# Patient Record
Sex: FEMALE | Race: WHITE | Employment: FULL TIME | URBAN - METROPOLITAN AREA
[De-identification: names, ages, dates, MRNs, and addresses within clinical notes are randomized per-mention and may not be internally consistent; named-entity substitution may affect disease eponyms.]

---

## 2020-12-04 PROBLEM — N97.0 ANOVULATORY BLEEDING: Status: ACTIVE | Noted: 2020-12-04

## 2020-12-04 PROBLEM — N93.9 ABNORMAL UTERINE BLEEDING (AUB): Status: ACTIVE | Noted: 2020-12-04

## 2023-05-30 ENCOUNTER — OFFICE VISIT (OUTPATIENT)
Dept: OBGYN CLINIC | Age: 28
End: 2023-05-30
Payer: COMMERCIAL

## 2023-05-30 ENCOUNTER — HOSPITAL ENCOUNTER (OUTPATIENT)
Age: 28
Discharge: HOME | End: 2023-05-30
Payer: COMMERCIAL

## 2023-05-30 VITALS — BODY MASS INDEX: 26.31 KG/M2 | WEIGHT: 163 LBS | DIASTOLIC BLOOD PRESSURE: 72 MMHG | SYSTOLIC BLOOD PRESSURE: 128 MMHG

## 2023-05-30 DIAGNOSIS — O03.9 SAB (SPONTANEOUS ABORTION): ICD-10-CM

## 2023-05-30 DIAGNOSIS — R11.0 NAUSEA: ICD-10-CM

## 2023-05-30 DIAGNOSIS — O03.9 SAB (SPONTANEOUS ABORTION): Primary | ICD-10-CM

## 2023-05-30 DIAGNOSIS — R10.2 PELVIC CRAMPING: ICD-10-CM

## 2023-05-30 DIAGNOSIS — Z67.91 BLOOD TYPE, RH NEGATIVE: ICD-10-CM

## 2023-05-30 PROBLEM — Z3A.39 39 WEEKS GESTATION OF PREGNANCY: Status: RESOLVED | Noted: 2021-12-20 | Resolved: 2023-05-30

## 2023-05-30 LAB
ABO + RH BLD: NORMAL
BLOOD GROUP ANTIBODIES SERPL: NEGATIVE

## 2023-05-30 PROCEDURE — 86901 BLOOD TYPING SEROLOGIC RH(D): CPT

## 2023-05-30 PROCEDURE — 86850 RBC ANTIBODY SCREEN: CPT

## 2023-05-30 PROCEDURE — 99214 OFFICE O/P EST MOD 30 MIN: CPT | Performed by: NURSE PRACTITIONER

## 2023-05-30 PROCEDURE — 84702 CHORIONIC GONADOTROPIN TEST: CPT

## 2023-05-30 PROCEDURE — 36415 COLL VENOUS BLD VENIPUNCTURE: CPT

## 2023-05-30 PROCEDURE — 86900 BLOOD TYPING SEROLOGIC ABO: CPT

## 2023-05-31 LAB — HCG QUANTITATIVE: ABNORMAL MIU/ML

## 2023-06-01 ENCOUNTER — NURSE ONLY (OUTPATIENT)
Dept: OBGYN CLINIC | Age: 28
End: 2023-06-01
Payer: COMMERCIAL

## 2023-06-01 ENCOUNTER — HOSPITAL ENCOUNTER (OUTPATIENT)
Age: 28
Discharge: HOME | End: 2023-06-01
Payer: COMMERCIAL

## 2023-06-01 VITALS — WEIGHT: 162.6 LBS | BODY MASS INDEX: 26.24 KG/M2 | DIASTOLIC BLOOD PRESSURE: 72 MMHG | SYSTOLIC BLOOD PRESSURE: 112 MMHG

## 2023-06-01 DIAGNOSIS — O03.9 SAB (SPONTANEOUS ABORTION): Primary | ICD-10-CM

## 2023-06-01 DIAGNOSIS — Z67.91 BLOOD TYPE, RH NEGATIVE: ICD-10-CM

## 2023-06-01 DIAGNOSIS — O03.9 SAB (SPONTANEOUS ABORTION): ICD-10-CM

## 2023-06-01 LAB — HCG QUANTITATIVE: ABNORMAL MIU/ML

## 2023-06-01 PROCEDURE — 96372 THER/PROPH/DIAG INJ SC/IM: CPT | Performed by: OBSTETRICS & GYNECOLOGY

## 2023-06-01 PROCEDURE — 36415 COLL VENOUS BLD VENIPUNCTURE: CPT

## 2023-06-01 PROCEDURE — 84702 CHORIONIC GONADOTROPIN TEST: CPT

## 2023-06-02 ENCOUNTER — TELEPHONE (OUTPATIENT)
Dept: OBGYN CLINIC | Age: 28
End: 2023-06-02

## 2023-06-02 ENCOUNTER — PREP FOR PROCEDURE (OUTPATIENT)
Dept: OBGYN CLINIC | Age: 28
End: 2023-06-02

## 2023-06-02 ENCOUNTER — HOSPITAL ENCOUNTER (OUTPATIENT)
Dept: PREADMISSION TESTING | Age: 28
End: 2023-06-02
Payer: COMMERCIAL

## 2023-06-02 VITALS
BODY MASS INDEX: 26.03 KG/M2 | WEIGHT: 162 LBS | DIASTOLIC BLOOD PRESSURE: 87 MMHG | HEART RATE: 106 BPM | SYSTOLIC BLOOD PRESSURE: 140 MMHG | OXYGEN SATURATION: 100 % | TEMPERATURE: 98.4 F | RESPIRATION RATE: 16 BRPM | HEIGHT: 66 IN

## 2023-06-02 DIAGNOSIS — O03.9 SAB (SPONTANEOUS ABORTION): Primary | ICD-10-CM

## 2023-06-02 LAB
ALBUMIN SERPL-MCNC: 4.4 G/DL (ref 3.5–5.2)
ALP SERPL-CCNC: 55 U/L (ref 35–104)
ALT SERPL-CCNC: 28 U/L (ref 5–33)
ANION GAP SERPL CALCULATED.3IONS-SCNC: 10 MMOL/L (ref 9–17)
AST SERPL-CCNC: 22 U/L
BASOPHILS # BLD: 0.1 K/UL (ref 0–0.2)
BASOPHILS NFR BLD: 1 % (ref 0–2)
BILIRUB SERPL-MCNC: 0.5 MG/DL (ref 0.3–1.2)
BILIRUB UR QL STRIP: NEGATIVE
BUN SERPL-MCNC: 10 MG/DL (ref 6–20)
CALCIUM SERPL-MCNC: 9.4 MG/DL (ref 8.6–10.4)
CHLORIDE SERPL-SCNC: 102 MMOL/L (ref 98–107)
CLARITY UR: CLEAR
CO2 SERPL-SCNC: 24 MMOL/L (ref 20–31)
COLOR UR: YELLOW
COMMENT UA: NORMAL
CREAT SERPL-MCNC: 0.5 MG/DL (ref 0.5–0.9)
EOSINOPHIL # BLD: 0.2 K/UL (ref 0–0.4)
EOSINOPHILS RELATIVE PERCENT: 2 % (ref 0–4)
ERYTHROCYTE [DISTWIDTH] IN BLOOD BY AUTOMATED COUNT: 13.7 % (ref 11.5–14.9)
GFR SERPL CREATININE-BSD FRML MDRD: >60 ML/MIN/1.73M2
GLUCOSE SERPL-MCNC: 92 MG/DL (ref 70–99)
GLUCOSE UR STRIP-MCNC: NEGATIVE MG/DL
HCT VFR BLD AUTO: 39.8 % (ref 36–46)
HGB BLD-MCNC: 13.5 G/DL (ref 12–16)
HGB UR QL STRIP.AUTO: NEGATIVE
INR PPP: 1.1
KETONES UR STRIP-MCNC: NEGATIVE MG/DL
LEUKOCYTE ESTERASE UR QL STRIP: NEGATIVE
LYMPHOCYTES # BLD: 27 % (ref 24–44)
LYMPHOCYTES NFR BLD: 2.5 K/UL (ref 1–4.8)
MCH RBC QN AUTO: 28.7 PG (ref 26–34)
MCHC RBC AUTO-ENTMCNC: 33.8 G/DL (ref 31–37)
MCV RBC AUTO: 84.8 FL (ref 80–100)
MONOCYTES NFR BLD: 0.6 K/UL (ref 0.1–1.3)
MONOCYTES NFR BLD: 6 % (ref 1–7)
NEUTROPHILS NFR BLD: 64 % (ref 36–66)
NEUTS SEG NFR BLD: 5.9 K/UL (ref 1.3–9.1)
NITRITE UR QL STRIP: NEGATIVE
PARTIAL THROMBOPLASTIN TIME: 31.2 SEC (ref 24–36)
PH UR STRIP: 6.5 [PH] (ref 5–8)
PLATELET # BLD AUTO: 292 K/UL (ref 150–450)
PMV BLD AUTO: 8.2 FL (ref 6–12)
POTASSIUM SERPL-SCNC: 3.8 MMOL/L (ref 3.7–5.3)
PROT SERPL-MCNC: 7.4 G/DL (ref 6.4–8.3)
PROT UR STRIP-MCNC: NEGATIVE MG/DL
PROTHROMBIN TIME: 14 SEC (ref 11.8–14.6)
RBC # BLD AUTO: 4.7 M/UL (ref 4–5.2)
SODIUM SERPL-SCNC: 136 MMOL/L (ref 135–144)
SP GR UR STRIP: 1.02 (ref 1–1.03)
UROBILINOGEN UR STRIP-ACNC: NORMAL
WBC OTHER # BLD: 9.3 K/UL (ref 3.5–11)

## 2023-06-02 PROCEDURE — 85730 THROMBOPLASTIN TIME PARTIAL: CPT

## 2023-06-02 PROCEDURE — 87086 URINE CULTURE/COLONY COUNT: CPT

## 2023-06-02 PROCEDURE — 80053 COMPREHEN METABOLIC PANEL: CPT

## 2023-06-02 PROCEDURE — 85610 PROTHROMBIN TIME: CPT

## 2023-06-02 PROCEDURE — 36415 COLL VENOUS BLD VENIPUNCTURE: CPT

## 2023-06-02 PROCEDURE — 85027 COMPLETE CBC AUTOMATED: CPT

## 2023-06-02 PROCEDURE — 81003 URINALYSIS AUTO W/O SCOPE: CPT

## 2023-06-02 RX ORDER — MISOPROSTOL 200 UG/1
800 TABLET ORAL ONCE
Qty: 4 TABLET | Refills: 1 | Status: SHIPPED | OUTPATIENT
Start: 2023-06-02 | End: 2023-06-02

## 2023-06-02 NOTE — DISCHARGE INSTRUCTIONS
Pre-op Instructions For Out-Patient Surgery    Medication Instructions:  Please stop herbs and any supplements now (includes vitamins and minerals). Please contact your surgeon and prescribing physician for pre-op instructions for any blood thinners. If you have inhalers/aerosol treatments at home, please use them the morning of your surgery and bring the inhalers with you to the hospital.    Please take the following medications the morning of your surgery with a sip of water:    None     Surgery Instructions:  After midnight before surgery:  Do not eat or drink anything, including water, mints, gum, and hard candy. You may brush your teeth without swallowing. No smoking, chewing tobacco, or street drugs. Please shower or bathe before surgery. If you were given Surgical Scrub Chlorhexidine Gluconate Liquid (CHG), please shower the night before and the morning of your surgery following the detailed instructions you received during your pre-admission visit. Please do not wear any cologne, lotion, powder, deodorant, jewelry, piercings, perfume, makeup, nail polish, hair accessories, or hair spray on the day of surgery. Wear loose comfortable clothing. Leave your valuables at home. Bring a storage case for any glasses/contacts. An adult who is responsible for you MUST drive you home and should be with you for the first 24 hours after surgery. If having out-patient knee and foot surgeries, please arrange for planned crutches, walker, or wheelchair before arriving to the hospital.    The Day of Surgery:  Arrive at [facility] AT [facility]tr[facility] at the time directed by your surgeon and check in at the desk. If you have a living will or healthcare power of , please bring a copy. You will be taken to the pre-op holding area where you will be prepared for surgery.   A physical assessment will be performed by a nurse practitioner or house

## 2023-06-03 LAB
MICROORGANISM SPEC CULT: NORMAL
SPECIMEN DESCRIPTION: NORMAL

## 2023-06-05 ENCOUNTER — HOSPITAL ENCOUNTER (OUTPATIENT)
Age: 28
Discharge: HOME | End: 2023-06-05
Payer: COMMERCIAL

## 2023-06-05 DIAGNOSIS — O03.9 SAB (SPONTANEOUS ABORTION): ICD-10-CM

## 2023-06-05 LAB — HCG QUANTITATIVE: ABNORMAL MIU/ML

## 2023-06-05 PROCEDURE — 84702 CHORIONIC GONADOTROPIN TEST: CPT

## 2023-06-05 PROCEDURE — 36415 COLL VENOUS BLD VENIPUNCTURE: CPT

## 2023-06-06 NOTE — TELEPHONE ENCOUNTER
Spoke to pt 6/2/23 HCG levels reviewed she wanted to be scheduled suction D &C and she was going to try cytotec though over weekend

## 2023-06-07 ENCOUNTER — ANESTHESIA EVENT (OUTPATIENT)
Dept: OPERATING ROOM | Age: 28
End: 2023-06-07
Payer: COMMERCIAL

## 2023-06-08 ENCOUNTER — ANESTHESIA (OUTPATIENT)
Dept: OPERATING ROOM | Age: 28
End: 2023-06-08
Payer: COMMERCIAL

## 2023-06-08 ENCOUNTER — HOSPITAL ENCOUNTER (OUTPATIENT)
Age: 28
Setting detail: OUTPATIENT SURGERY
Discharge: HOME | End: 2023-06-08
Attending: OBSTETRICS & GYNECOLOGY | Admitting: OBSTETRICS & GYNECOLOGY
Payer: COMMERCIAL

## 2023-06-08 VITALS
RESPIRATION RATE: 12 BRPM | WEIGHT: 162 LBS | BODY MASS INDEX: 26.03 KG/M2 | OXYGEN SATURATION: 96 % | TEMPERATURE: 97.3 F | DIASTOLIC BLOOD PRESSURE: 67 MMHG | SYSTOLIC BLOOD PRESSURE: 117 MMHG | HEART RATE: 85 BPM | HEIGHT: 66 IN

## 2023-06-08 DIAGNOSIS — O02.1 MISSED ABORTION: ICD-10-CM

## 2023-06-08 PROCEDURE — 3700000001 HC ADD 15 MINUTES (ANESTHESIA): Performed by: OBSTETRICS & GYNECOLOGY

## 2023-06-08 PROCEDURE — 2500000003 HC RX 250 WO HCPCS: Performed by: NURSE ANESTHETIST, CERTIFIED REGISTERED

## 2023-06-08 PROCEDURE — 88305 TISSUE EXAM BY PATHOLOGIST: CPT

## 2023-06-08 PROCEDURE — 3600000012 HC SURGERY LEVEL 2 ADDTL 15MIN: Performed by: OBSTETRICS & GYNECOLOGY

## 2023-06-08 PROCEDURE — 7100000001 HC PACU RECOVERY - ADDTL 15 MIN: Performed by: OBSTETRICS & GYNECOLOGY

## 2023-06-08 PROCEDURE — 2580000003 HC RX 258: Performed by: ANESTHESIOLOGY

## 2023-06-08 PROCEDURE — 6360000002 HC RX W HCPCS: Performed by: NURSE ANESTHETIST, CERTIFIED REGISTERED

## 2023-06-08 PROCEDURE — 7100000010 HC PHASE II RECOVERY - FIRST 15 MIN: Performed by: OBSTETRICS & GYNECOLOGY

## 2023-06-08 PROCEDURE — 3600000002 HC SURGERY LEVEL 2 BASE: Performed by: OBSTETRICS & GYNECOLOGY

## 2023-06-08 PROCEDURE — 6370000000 HC RX 637 (ALT 250 FOR IP): Performed by: ANESTHESIOLOGY

## 2023-06-08 PROCEDURE — 7100000031 HC ASPR PHASE II RECOVERY - ADDTL 15 MIN: Performed by: OBSTETRICS & GYNECOLOGY

## 2023-06-08 PROCEDURE — 7100000030 HC ASPR PHASE II RECOVERY - FIRST 15 MIN: Performed by: OBSTETRICS & GYNECOLOGY

## 2023-06-08 PROCEDURE — 6360000002 HC RX W HCPCS: Performed by: ANESTHESIOLOGY

## 2023-06-08 PROCEDURE — 3700000000 HC ANESTHESIA ATTENDED CARE: Performed by: OBSTETRICS & GYNECOLOGY

## 2023-06-08 PROCEDURE — 2709999900 HC NON-CHARGEABLE SUPPLY: Performed by: OBSTETRICS & GYNECOLOGY

## 2023-06-08 PROCEDURE — 7100000000 HC PACU RECOVERY - FIRST 15 MIN: Performed by: OBSTETRICS & GYNECOLOGY

## 2023-06-08 PROCEDURE — 6370000000 HC RX 637 (ALT 250 FOR IP): Performed by: STUDENT IN AN ORGANIZED HEALTH CARE EDUCATION/TRAINING PROGRAM

## 2023-06-08 PROCEDURE — 7100000011 HC PHASE II RECOVERY - ADDTL 15 MIN: Performed by: OBSTETRICS & GYNECOLOGY

## 2023-06-08 RX ORDER — HYDRALAZINE HYDROCHLORIDE 20 MG/ML
10 INJECTION INTRAMUSCULAR; INTRAVENOUS
Status: DISCONTINUED | OUTPATIENT
Start: 2023-06-08 | End: 2023-06-08 | Stop reason: HOSPADM

## 2023-06-08 RX ORDER — SENNA AND DOCUSATE SODIUM 50; 8.6 MG/1; MG/1
1 TABLET, FILM COATED ORAL DAILY
Qty: 14 TABLET | Refills: 0 | Status: SHIPPED | OUTPATIENT
Start: 2023-06-08 | End: 2023-06-08 | Stop reason: SDUPTHER

## 2023-06-08 RX ORDER — LIDOCAINE HYDROCHLORIDE 10 MG/ML
1 INJECTION, SOLUTION EPIDURAL; INFILTRATION; INTRACAUDAL; PERINEURAL
Status: DISCONTINUED | OUTPATIENT
Start: 2023-06-08 | End: 2023-06-08 | Stop reason: HOSPADM

## 2023-06-08 RX ORDER — LABETALOL HYDROCHLORIDE 5 MG/ML
10 INJECTION, SOLUTION INTRAVENOUS
Status: DISCONTINUED | OUTPATIENT
Start: 2023-06-08 | End: 2023-06-08 | Stop reason: HOSPADM

## 2023-06-08 RX ORDER — SODIUM CHLORIDE 9 MG/ML
INJECTION, SOLUTION INTRAVENOUS PRN
Status: DISCONTINUED | OUTPATIENT
Start: 2023-06-08 | End: 2023-06-08 | Stop reason: HOSPADM

## 2023-06-08 RX ORDER — GABAPENTIN 300 MG/1
300 CAPSULE ORAL ONCE
Status: DISCONTINUED | OUTPATIENT
Start: 2023-06-08 | End: 2023-06-08 | Stop reason: HOSPADM

## 2023-06-08 RX ORDER — LIDOCAINE HYDROCHLORIDE 10 MG/ML
INJECTION, SOLUTION EPIDURAL; INFILTRATION; INTRACAUDAL; PERINEURAL PRN
Status: DISCONTINUED | OUTPATIENT
Start: 2023-06-08 | End: 2023-06-08 | Stop reason: SDUPTHER

## 2023-06-08 RX ORDER — IBUPROFEN 600 MG/1
600 TABLET ORAL EVERY 6 HOURS PRN
Qty: 30 TABLET | Refills: 0 | Status: SHIPPED | OUTPATIENT
Start: 2023-06-08

## 2023-06-08 RX ORDER — ONDANSETRON 4 MG/1
4 TABLET, ORALLY DISINTEGRATING ORAL 3 TIMES DAILY PRN
Qty: 21 TABLET | Refills: 0 | Status: SHIPPED | OUTPATIENT
Start: 2023-06-08

## 2023-06-08 RX ORDER — SENNA AND DOCUSATE SODIUM 50; 8.6 MG/1; MG/1
1 TABLET, FILM COATED ORAL DAILY
Qty: 14 TABLET | Refills: 0 | Status: SHIPPED | OUTPATIENT
Start: 2023-06-08 | End: 2023-06-22

## 2023-06-08 RX ORDER — SODIUM CHLORIDE 0.9 % (FLUSH) 0.9 %
5-40 SYRINGE (ML) INJECTION PRN
Status: DISCONTINUED | OUTPATIENT
Start: 2023-06-08 | End: 2023-06-08 | Stop reason: HOSPADM

## 2023-06-08 RX ORDER — ACETAMINOPHEN 500 MG
1000 TABLET ORAL EVERY 6 HOURS PRN
Qty: 120 TABLET | Refills: 0 | Status: SHIPPED | OUTPATIENT
Start: 2023-06-08 | End: 2023-07-08

## 2023-06-08 RX ORDER — METHYLERGONOVINE MALEATE 0.2 MG/ML
INJECTION INTRAVENOUS PRN
Status: DISCONTINUED | OUTPATIENT
Start: 2023-06-08 | End: 2023-06-08 | Stop reason: SDUPTHER

## 2023-06-08 RX ORDER — ONDANSETRON 2 MG/ML
4 INJECTION INTRAMUSCULAR; INTRAVENOUS
Status: COMPLETED | OUTPATIENT
Start: 2023-06-08 | End: 2023-06-08

## 2023-06-08 RX ORDER — HYDROCODONE BITARTRATE AND ACETAMINOPHEN 5; 325 MG/1; MG/1
1 TABLET ORAL EVERY 6 HOURS PRN
Status: DISCONTINUED | OUTPATIENT
Start: 2023-06-08 | End: 2023-06-08 | Stop reason: HOSPADM

## 2023-06-08 RX ORDER — KETOROLAC TROMETHAMINE 30 MG/ML
INJECTION, SOLUTION INTRAMUSCULAR; INTRAVENOUS PRN
Status: DISCONTINUED | OUTPATIENT
Start: 2023-06-08 | End: 2023-06-08 | Stop reason: SDUPTHER

## 2023-06-08 RX ORDER — SCOLOPAMINE TRANSDERMAL SYSTEM 1 MG/1
1 PATCH, EXTENDED RELEASE TRANSDERMAL ONCE
Status: DISCONTINUED | OUTPATIENT
Start: 2023-06-08 | End: 2023-06-08 | Stop reason: HOSPADM

## 2023-06-08 RX ORDER — SODIUM CHLORIDE, SODIUM LACTATE, POTASSIUM CHLORIDE, CALCIUM CHLORIDE 600; 310; 30; 20 MG/100ML; MG/100ML; MG/100ML; MG/100ML
INJECTION, SOLUTION INTRAVENOUS CONTINUOUS
Status: DISCONTINUED | OUTPATIENT
Start: 2023-06-08 | End: 2023-06-08 | Stop reason: HOSPADM

## 2023-06-08 RX ORDER — DOXYCYCLINE 100 MG/1
200 CAPSULE ORAL ONCE
Status: COMPLETED | OUTPATIENT
Start: 2023-06-08 | End: 2023-06-08

## 2023-06-08 RX ORDER — METHYLERGONOVINE MALEATE 0.2 MG/1
0.2 TABLET ORAL 3 TIMES DAILY
Qty: 9 TABLET | Refills: 0 | Status: SHIPPED | OUTPATIENT
Start: 2023-06-08 | End: 2023-06-11

## 2023-06-08 RX ORDER — PROPOFOL 10 MG/ML
INJECTION, EMULSION INTRAVENOUS PRN
Status: DISCONTINUED | OUTPATIENT
Start: 2023-06-08 | End: 2023-06-08 | Stop reason: SDUPTHER

## 2023-06-08 RX ORDER — DIPHENHYDRAMINE HYDROCHLORIDE 50 MG/ML
12.5 INJECTION INTRAMUSCULAR; INTRAVENOUS
Status: DISCONTINUED | OUTPATIENT
Start: 2023-06-08 | End: 2023-06-08 | Stop reason: HOSPADM

## 2023-06-08 RX ORDER — SODIUM CHLORIDE 0.9 % (FLUSH) 0.9 %
5-40 SYRINGE (ML) INJECTION EVERY 12 HOURS SCHEDULED
Status: DISCONTINUED | OUTPATIENT
Start: 2023-06-08 | End: 2023-06-08 | Stop reason: HOSPADM

## 2023-06-08 RX ORDER — MIDAZOLAM HYDROCHLORIDE 1 MG/ML
INJECTION INTRAMUSCULAR; INTRAVENOUS PRN
Status: DISCONTINUED | OUTPATIENT
Start: 2023-06-08 | End: 2023-06-08 | Stop reason: SDUPTHER

## 2023-06-08 RX ORDER — ONDANSETRON 2 MG/ML
INJECTION INTRAMUSCULAR; INTRAVENOUS PRN
Status: DISCONTINUED | OUTPATIENT
Start: 2023-06-08 | End: 2023-06-08 | Stop reason: SDUPTHER

## 2023-06-08 RX ORDER — ACETAMINOPHEN 500 MG
1000 TABLET ORAL ONCE
Status: DISCONTINUED | OUTPATIENT
Start: 2023-06-08 | End: 2023-06-08 | Stop reason: HOSPADM

## 2023-06-08 RX ORDER — FENTANYL CITRATE 0.05 MG/ML
25 INJECTION, SOLUTION INTRAMUSCULAR; INTRAVENOUS EVERY 5 MIN PRN
Status: DISCONTINUED | OUTPATIENT
Start: 2023-06-08 | End: 2023-06-08 | Stop reason: HOSPADM

## 2023-06-08 RX ORDER — DEXAMETHASONE SODIUM PHOSPHATE 4 MG/ML
INJECTION, SOLUTION INTRA-ARTICULAR; INTRALESIONAL; INTRAMUSCULAR; INTRAVENOUS; SOFT TISSUE PRN
Status: DISCONTINUED | OUTPATIENT
Start: 2023-06-08 | End: 2023-06-08 | Stop reason: SDUPTHER

## 2023-06-08 RX ORDER — FENTANYL CITRATE 50 UG/ML
INJECTION, SOLUTION INTRAMUSCULAR; INTRAVENOUS PRN
Status: DISCONTINUED | OUTPATIENT
Start: 2023-06-08 | End: 2023-06-08 | Stop reason: SDUPTHER

## 2023-06-08 RX ORDER — DOXYCYCLINE HYCLATE 100 MG
100 TABLET ORAL DAILY
Qty: 3 TABLET | Refills: 0 | Status: SHIPPED | OUTPATIENT
Start: 2023-06-08 | End: 2023-06-11

## 2023-06-08 RX ORDER — CARBOPROST TROMETHAMINE 250 UG/ML
INJECTION, SOLUTION INTRAMUSCULAR PRN
Status: DISCONTINUED | OUTPATIENT
Start: 2023-06-08 | End: 2023-06-08 | Stop reason: SDUPTHER

## 2023-06-08 RX ORDER — METOCLOPRAMIDE HYDROCHLORIDE 5 MG/ML
10 INJECTION INTRAMUSCULAR; INTRAVENOUS
Status: COMPLETED | OUTPATIENT
Start: 2023-06-08 | End: 2023-06-08

## 2023-06-08 RX ADMIN — FENTANYL CITRATE 25 MCG: 0.05 INJECTION, SOLUTION INTRAMUSCULAR; INTRAVENOUS at 09:49

## 2023-06-08 RX ADMIN — KETOROLAC TROMETHAMINE 30 MG: 30 INJECTION, SOLUTION INTRAMUSCULAR; INTRAVENOUS at 08:52

## 2023-06-08 RX ADMIN — FENTANYL CITRATE 50 MCG: 50 INJECTION, SOLUTION INTRAMUSCULAR; INTRAVENOUS at 08:32

## 2023-06-08 RX ADMIN — METOCLOPRAMIDE 10 MG: 5 INJECTION, SOLUTION INTRAMUSCULAR; INTRAVENOUS at 09:47

## 2023-06-08 RX ADMIN — PROPOFOL 200 MG: 10 INJECTION, EMULSION INTRAVENOUS at 08:32

## 2023-06-08 RX ADMIN — MIDAZOLAM 2 MG: 1 INJECTION INTRAMUSCULAR; INTRAVENOUS at 08:26

## 2023-06-08 RX ADMIN — SODIUM CHLORIDE, POTASSIUM CHLORIDE, SODIUM LACTATE AND CALCIUM CHLORIDE: 600; 310; 30; 20 INJECTION, SOLUTION INTRAVENOUS at 06:35

## 2023-06-08 RX ADMIN — DOXYCYCLINE 200 MG: 100 CAPSULE ORAL at 07:46

## 2023-06-08 RX ADMIN — LIDOCAINE HYDROCHLORIDE 50 MG: 10 INJECTION, SOLUTION EPIDURAL; INFILTRATION; INTRACAUDAL; PERINEURAL at 08:32

## 2023-06-08 RX ADMIN — CARBOPROST TROMETHAMINE 250 MCG: 250 INJECTION, SOLUTION INTRAMUSCULAR at 09:18

## 2023-06-08 RX ADMIN — SODIUM CHLORIDE, POTASSIUM CHLORIDE, SODIUM LACTATE AND CALCIUM CHLORIDE: 600; 310; 30; 20 INJECTION, SOLUTION INTRAVENOUS at 08:57

## 2023-06-08 RX ADMIN — DEXAMETHASONE SODIUM PHOSPHATE 4 MG: 4 INJECTION, SOLUTION INTRAMUSCULAR; INTRAVENOUS at 08:44

## 2023-06-08 RX ADMIN — ONDANSETRON 4 MG: 2 INJECTION INTRAMUSCULAR; INTRAVENOUS at 08:44

## 2023-06-08 RX ADMIN — METHYLERGONOVINE MALEATE 200 MCG: 0.2 INJECTION, SOLUTION INTRAMUSCULAR; INTRAVENOUS at 09:01

## 2023-06-08 RX ADMIN — ONDANSETRON 4 MG: 2 INJECTION INTRAMUSCULAR; INTRAVENOUS at 09:39

## 2023-06-08 ASSESSMENT — PAIN DESCRIPTION - PAIN TYPE
TYPE: SURGICAL PAIN

## 2023-06-08 ASSESSMENT — PAIN DESCRIPTION - ORIENTATION
ORIENTATION: LOWER

## 2023-06-08 ASSESSMENT — PAIN SCALES - GENERAL
PAINLEVEL_OUTOF10: 2
PAINLEVEL_OUTOF10: 2
PAINLEVEL_OUTOF10: 0
PAINLEVEL_OUTOF10: 0
PAINLEVEL_OUTOF10: 4

## 2023-06-08 ASSESSMENT — PAIN DESCRIPTION - DESCRIPTORS
DESCRIPTORS: CRAMPING

## 2023-06-08 ASSESSMENT — PAIN - FUNCTIONAL ASSESSMENT: PAIN_FUNCTIONAL_ASSESSMENT: 0-10

## 2023-06-08 ASSESSMENT — PAIN DESCRIPTION - LOCATION
LOCATION: ABDOMEN

## 2023-06-08 ASSESSMENT — LIFESTYLE VARIABLES: SMOKING_STATUS: 0

## 2023-06-08 NOTE — ANESTHESIA PRE PROCEDURE
ALKPHOS 55 2023 03:12 PM    AST 22 2023 03:12 PM    ALT 28 2023 03:12 PM       POC Tests: No results for input(s): POCGLU, POCNA, POCK, POCCL, POCBUN, POCHEMO, POCHCT in the last 72 hours. Coags:   Lab Results   Component Value Date/Time    PROTIME 14.0 2023 03:12 PM    INR 1.1 2023 03:12 PM    APTT 31.2 2023 03:12 PM       HCG (If Applicable):   Lab Results   Component Value Date    HCGQUANT 56,505 (H) 2023        ABGs: No results found for: PHART, PO2ART, SQF2KWY, DJD3AOV, BEART, L3NKRASS     Type & Screen (If Applicable):  No results found for: LABABO, LABRH    Drug/Infectious Status (If Applicable):  No results found for: HIV, HEPCAB    COVID-19 Screening (If Applicable): No results found for: COVID19        Anesthesia Evaluation  Patient summary reviewed and Nursing notes reviewed no history of anesthetic complications:   Airway: Mallampati: II  TM distance: >3 FB   Neck ROM: full  Mouth opening: > = 3 FB   Dental: normal exam         Pulmonary:Negative Pulmonary ROS and normal exam  breath sounds clear to auscultation      (-) not a current smoker                           Cardiovascular:Negative CV ROS  Exercise tolerance: good (>4 METS),           Rhythm: regular  Rate: normal                    Neuro/Psych:   Negative Neuro/Psych ROS              GI/Hepatic/Renal:   (+) PUD (resolved),          ROS comment: Missed , 9 weeks gestation  Nausea with pregnancy, resolved. Endo/Other: Negative Endo/Other ROS                    Abdominal:             Vascular: negative vascular ROS. Other Findings:           Anesthesia Plan      general     ASA 2     (LMA, preop scopolamine)  Induction: intravenous. MIPS: Postoperative opioids intended and Prophylactic antiemetics administered. Anesthetic plan and risks discussed with patient. Plan discussed with CRNA.                     Keira Lay MD   2023

## 2023-06-08 NOTE — H&P
observations, additional testing to confirm   results is recommended. Hospital Outpatient Visit on 05/30/2023   Component Date Value Ref Range Status    ABO/Rh 05/30/2023 A NEGATIVE   Final    Antibody Screen 05/30/2023 NEGATIVE   Final    hCG Quant 05/30/2023 90,460 (H)  <5 mIU/mL Final    Comment:    Non-preg premeno   <=5  Postmeno           <=8  Male               <=3  If HCG results do not concur with clinical observations, additional testing to confirm   results is recommended. DIAGNOSTICS:  US OB LESS THAN 14 WEEKS SINGLE OR FIRST GESTATION    Result Date: 6/4/2023  Exam Date: 5/30/2023 Early IUP No cardiac activity visualized Fetal pole visualized CRL measuring 9w0d Yolk sac visualized Subchorionic Hematoma: none       DIAGNOSIS & PLAN:  1. Missed AB   - Proceed with planned procedure: Suction D&C   - Consent signed, on chart. - The patient is ready for transport to the operative suite. Counseling: The patient was counseled on all options both medical and surgical, conservative as well as definitive. She has elected to proceed with the procedure as stated above. The patient was counseled on the procedure. Risks and complications were reviewed in detail. The patients orders, labs, consents have been completed. The history and physical as well as all supporting surgical documentation will be forwarded to the pre-operative holding area. The patient is aware that this procedure may not alleviate her symptoms. That there may be a necessity for a second surgery and that there may be an incomplete removal of abnormal tissue.     Diogenes Maya DO  Ob/Gyn Resident    [facility]  6/8/2023, 7:34 AM

## 2023-06-08 NOTE — ANESTHESIA POSTPROCEDURE EVALUATION
Department of Anesthesiology  Postprocedure Note    Patient: Addy Lynne  MRN: [id number]  [place]: 1995  Date of evaluation: 2023      Procedure Summary     Date: 23 Room / Location: [address] [place] [address]: YUAN LEVY    Anesthesia Start: 7578 Anesthesia Stop: 3702    Procedure: DILATATION AND CURETTAGE SUCTION (Uterus) Diagnosis:       Missed       (Missed  [O02.1])    Surgeons: Geoffrey Hayes DO Responsible Provider: Suha Shaffer MD    Anesthesia Type: General ASA Status: 2          Anesthesia Type: General    Esteban Phase I: Esteban Score: 10    Esteban Phase II:        Anesthesia Post Evaluation    Comments: POST- ANESTHESIA EVALUATION       Pt Name: Addy Lynne  MRN: [id number]  [place]: 1995  Date of evaluation: 2023  Time:  11:26 AM      /77   Pulse 88   Temp 97.5 °F (36.4 °C)   Resp 13   Ht 5' 6\" (1.676 m)   Wt 162 lb (73.5 kg)   LMP 2023 (Exact Date)   SpO2 97%   BMI 26.15 kg/m²      Consciousness Level  Awake  Cardiopulmonary Status  Stable  Pain Adequately Treated YES  Nausea / Vomiting  NO  Adequate Hydration  YES  Anesthesia Related Complications NONE      Electronically signed by Suha Shaffer MD on 2023 at 11:26 AM

## 2023-06-08 NOTE — OP NOTE
site on the cervix. Instrument, sponge, and needle counts were correct at the conclusion of the case. SCDs for DVT prophylaxis remain in place for the post operative period. Dr. Stacy Chavez was present for the entire operation.     Findings:  Approximately 9 week sized anteverted uterus, products of conception  Estimated Blood Loss: 700 ml  Drains:  None  Total IV Fluids: 1500ml  Urine output: 200 ml clear urine   Specimens: products of conception, sent to pathology  Instrument and Sponge Count: Correct  Complications: none  Condition: stable, transfer to post anesthesia recovery    Tonia Seymour DO  Ob/Gyn Resident  6/8/2023, 9:25 AM

## 2023-06-08 NOTE — PROGRESS NOTES
I discussed need for Rhogam with Dr. Melissa Pagan.  He say that since the patient received Rhogam in the office, she does not need it now.

## 2023-06-08 NOTE — PROGRESS NOTES
CLINICAL PHARMACY NOTE: MEDS TO BEDS    Total # of Prescriptions Filled: 3   The following medications were delivered to the patient:  Ibuprofen 600mg  Ondansetron 4mg ODT  Methylergonovine Maleate 0.2mg    Additional Documentation:    Medication Picked Up in Pharmacy by Patients Family 6/8/23    OTC(s) Not Covered + Profiled Rx(s)   - Tylenol    - Senokot

## 2023-06-09 PROBLEM — O02.1 MISSED ABORTION: Status: ACTIVE | Noted: 2023-06-09

## 2023-06-09 LAB — SURGICAL PATHOLOGY REPORT: NORMAL

## 2023-06-21 ENCOUNTER — OFFICE VISIT (OUTPATIENT)
Dept: OBGYN CLINIC | Age: 28
End: 2023-06-21

## 2023-06-21 VITALS — BODY MASS INDEX: 26.15 KG/M2 | SYSTOLIC BLOOD PRESSURE: 110 MMHG | DIASTOLIC BLOOD PRESSURE: 60 MMHG | HEIGHT: 66 IN

## 2023-06-21 DIAGNOSIS — O02.1 MISSED ABORTION: Primary | ICD-10-CM

## 2023-06-21 DIAGNOSIS — O03.9 SAB (SPONTANEOUS ABORTION): ICD-10-CM

## 2023-06-21 DIAGNOSIS — Z98.890 S/P D&C (STATUS POST DILATION AND CURETTAGE): ICD-10-CM

## 2023-06-21 PROCEDURE — 99024 POSTOP FOLLOW-UP VISIT: CPT | Performed by: OBSTETRICS & GYNECOLOGY

## 2023-06-21 ASSESSMENT — PATIENT HEALTH QUESTIONNAIRE - PHQ9
SUM OF ALL RESPONSES TO PHQ QUESTIONS 1-9: 0
SUM OF ALL RESPONSES TO PHQ QUESTIONS 1-9: 0
1. LITTLE INTEREST OR PLEASURE IN DOING THINGS: 0
SUM OF ALL RESPONSES TO PHQ QUESTIONS 1-9: 0
SUM OF ALL RESPONSES TO PHQ QUESTIONS 1-9: 0
SUM OF ALL RESPONSES TO PHQ9 QUESTIONS 1 & 2: 0
2. FEELING DOWN, DEPRESSED OR HOPELESS: 0

## 2023-06-22 NOTE — PROGRESS NOTES
Yg Lu  2023  6:56 PM      Yg Lu  Procedure:    Diagnosis Orders   1. Missed   hCG, Quantitative, Pregnancy      2. SAB (spontaneous )  hCG, Quantitative, Pregnancy      3. S/P suction D&C 2023  hCG, Quantitative, Pregnancy        Yg Lu is a 28 y.o. female       The patient was seen, she denies any complaints. She denied any shortness of breath, chest pain or dizziness. She denied any nausea, vomiting, or diarrhea. There is no fever, chills, or rigors. The patient is having vaginal spotting, discharge or odor. She is coping well overall, but is grieving loss and grieving appropriately. Blood pressure 110/60, height 5' 6\" (1.676 m), last menstrual period 2023, not currently breastfeeding. Abdominal Exam: soft non-tender. Good bowel sounds. No guarding, rebound or rigidity. No costal vertebral angle tenderness bilateral. No hernias    Incision: no incision    Extremities: No edema or calf pain noted bilaterally. Pelvic Exam:Exam deferred. Results for orders placed or performed during the hospital encounter of 23   SURGICAL PATHOLOGY REPORT   Result Value Ref Range    Surgical Pathology Report       Path Number: [id number]    -- Diagnosis --  PRODUCTS OF CONCEPTION, DILATATION CURETTAGE:-PRODUCTS OF CONCEPTION  PRESENT. Marcia Castro  **Electronically Signed Out**         ag/2023     Clinical Information  Pre-Op Diagnosis:  MISSED ; PATIENT REFUSED TO SIGN EARLY LOSS  FOR (PER JOE FORRESTER)  Operative Findings:  PRODUCTS OF CONCEPTION  Operation Performed:  DILATION AND CURETTAGE SUCTION  cd        Source of Specimen  A: PRODUCTS OF CONCEPTION      Gross Description  \"HUDSON FISH, PRODUCTS OF CONCEPTION\" Received in formalin is a 6.5 x  6.5 x 3.5 cm aggregate of pink-tan ragged tissue admixed with red  gelatinous and frothy tissue. Possible scant chorionic villi is  identified.   No embryo, fetal parts or vesicles are

## 2023-06-23 ENCOUNTER — HOSPITAL ENCOUNTER (OUTPATIENT)
Age: 28
Discharge: HOME | End: 2023-06-23
Payer: COMMERCIAL

## 2023-06-23 DIAGNOSIS — O03.9 SAB (SPONTANEOUS ABORTION): ICD-10-CM

## 2023-06-23 DIAGNOSIS — O02.1 MISSED ABORTION: ICD-10-CM

## 2023-06-23 DIAGNOSIS — Z98.890 S/P D&C (STATUS POST DILATION AND CURETTAGE): ICD-10-CM

## 2023-06-23 LAB — HCG QUANTITATIVE: 37 MIU/ML

## 2023-06-23 PROCEDURE — 84702 CHORIONIC GONADOTROPIN TEST: CPT

## 2023-06-23 PROCEDURE — 36415 COLL VENOUS BLD VENIPUNCTURE: CPT

## 2023-07-07 ENCOUNTER — HOSPITAL ENCOUNTER (OUTPATIENT)
Age: 28
Discharge: HOME | End: 2023-07-07
Payer: COMMERCIAL

## 2023-07-07 LAB — B-HCG SERPL EIA 3RD IS-ACNC: 5 MIU/ML

## 2023-07-07 PROCEDURE — 36415 COLL VENOUS BLD VENIPUNCTURE: CPT

## 2023-07-07 PROCEDURE — 84702 CHORIONIC GONADOTROPIN TEST: CPT

## 2023-07-10 ENCOUNTER — TELEPHONE (OUTPATIENT)
Dept: OBGYN CLINIC | Age: 28
End: 2023-07-10

## 2023-07-10 LAB
CHOLEST SERPL-MCNC: 163 MG/DL
CHOLESTEROL/HDL RATIO: 2.4
GLUCOSE SERPL-MCNC: 96 MG/DL (ref 70–99)
HDLC SERPL-MCNC: 68 MG/DL
LDLC SERPL CALC-MCNC: 81 MG/DL (ref 0–130)
PATIENT FASTING?: YES
TRIGL SERPL-MCNC: 72 MG/DL

## 2023-09-06 ENCOUNTER — TELEPHONE (OUTPATIENT)
Dept: OBGYN CLINIC | Age: 28
End: 2023-09-06

## 2023-09-06 DIAGNOSIS — Z32.01 POSITIVE PREGNANCY TEST: Primary | ICD-10-CM

## 2023-09-06 DIAGNOSIS — N91.2 AMENORRHEA: ICD-10-CM

## 2023-09-06 NOTE — TELEPHONE ENCOUNTER
+ preg test  Ordering HCG  She is wondering if we need to start progesterone  Wait until HCG or wait until US shows intrauterine pregnancy

## 2023-09-07 ENCOUNTER — HOSPITAL ENCOUNTER (OUTPATIENT)
Age: 28
Discharge: HOME | End: 2023-09-07
Payer: COMMERCIAL

## 2023-09-07 DIAGNOSIS — Z32.01 POSITIVE PREGNANCY TEST: ICD-10-CM

## 2023-09-07 DIAGNOSIS — N91.2 AMENORRHEA: ICD-10-CM

## 2023-09-07 LAB — B-HCG SERPL EIA 3RD IS-ACNC: 188.7 MIU/ML

## 2023-09-07 PROCEDURE — 84702 CHORIONIC GONADOTROPIN TEST: CPT

## 2023-09-07 PROCEDURE — 36415 COLL VENOUS BLD VENIPUNCTURE: CPT

## 2023-09-08 ENCOUNTER — TELEPHONE (OUTPATIENT)
Dept: OBGYN CLINIC | Age: 28
End: 2023-09-08

## 2023-09-08 NOTE — TELEPHONE ENCOUNTER
+hCG.  Please notify patient and make sure taking PNV and folic acid. Call for any concerns. Can get scheduled for IPV and dating ultrasound as appropriate. Thank you. Pt was made aware of her results and recommendations-     Pt is wondering with her having to have a d/c does she need to take any progesterone?

## 2023-09-08 NOTE — TELEPHONE ENCOUNTER
We talked about early progesterone given history of miscarriage. She can start this if she would like, but she doesn't need it because of the D&C. Thank you.

## 2023-09-08 NOTE — TELEPHONE ENCOUNTER
hCG positive. We can start the progesterone. Can trend hCG if she would like, but please get scheduled for IPV/Dating as appropriate. Thank you. 100mg nightly thank you.

## 2023-09-11 NOTE — TELEPHONE ENCOUNTER
See other encounter, aware of results  Does not need progesterone, she was OK with that.     Another HCG pending due to SAB

## 2023-09-18 ENCOUNTER — TELEPHONE (OUTPATIENT)
Dept: OBGYN CLINIC | Age: 28
End: 2023-09-18

## 2023-09-18 NOTE — TELEPHONE ENCOUNTER
GRZEGORZ, patient had positive HCG on 9/7 of 188- she called today with heavy bleeding. Assuming a SAB. She was instructed to keep eye on bleeding, and cramping. She will get HCG done , then repeat again in 72 hours to compare. Will keep us updated, if things change or worsen she will call office.

## 2023-09-18 NOTE — TELEPHONE ENCOUNTER
Thank you for update. Please call and follow up with her this week and extend our thoughts and prayers and see if there is anything that she needs. We can continue to trend hCG. Hydration is important. Thank you.

## 2023-09-19 ENCOUNTER — HOSPITAL ENCOUNTER (OUTPATIENT)
Age: 28
Discharge: HOME | End: 2023-09-19
Payer: COMMERCIAL

## 2023-09-19 DIAGNOSIS — N91.2 AMENORRHEA: ICD-10-CM

## 2023-09-19 DIAGNOSIS — Z32.01 POSITIVE PREGNANCY TEST: ICD-10-CM

## 2023-09-19 LAB — B-HCG SERPL EIA 3RD IS-ACNC: <1 MIU/ML

## 2023-09-19 PROCEDURE — 84702 CHORIONIC GONADOTROPIN TEST: CPT

## 2023-09-19 PROCEDURE — 36415 COLL VENOUS BLD VENIPUNCTURE: CPT

## 2024-01-01 NOTE — TELEPHONE ENCOUNTER
Problem: Enteral Nutrition  Goal: Absence of Aspiration Signs and Symptoms  Outcome: Progressing     Problem:  Infant  Goal: Neurobehavioral Stability  Intervention: Promote Neurodevelopmental Protection  Recent Flowsheet Documentation  Taken 2024 0530 by Luz Abreu RN  Environmental Modifications:   slow, gentle handling   lighting decreased  Stability/Consolability Measures:   repositioned   swaddled   therapeutic touch used   nonnutritive sucking  Taken 2024 0230 by Luz Abreu RN  Environmental Modifications:   slow, gentle handling   lighting decreased  Stability/Consolability Measures:   repositioned   swaddled   therapeutic touch used   nonnutritive sucking     Problem:  Infant  Goal: Optimal Growth and Development Pattern  Intervention: Promote Effective Feeding Behavior  Recent Flowsheet Documentation  Taken 2024 0530 by Luz Abreu, YOSI  Feeding Interventions:   feeding cues monitored   feeding paced  Taken 2024 0230 by Luz Abreu RN  Aspiration Precautions:   alert and awake before feeding   burping promoted  Feeding Interventions:   feeding cues monitored   feeding paced  Taken 2024 2330 by Luz Abreu, YOSI  Feeding Interventions:   feeding cues monitored   feeding paced   Goal Outcome Evaluation:       Remains stable, VSS. Had a few brief drifts that were self recovered. Repositioned. Tolerated bottling. Voids and stools. Will continue to monitor.                  ----- Message from Jennifer Johnson DO sent at 7/8/2023  5:51 PM EDT -----  Please notify patient that hCG is 5. Technically negative is less than 5. Can place another hCG order for 1-2 weeks if she would like, however, hCG has decreased and expect negative shortly.

## 2024-01-17 ENCOUNTER — APPOINTMENT (OUTPATIENT)
Dept: ULTRASOUND IMAGING | Age: 29
End: 2024-01-17
Payer: COMMERCIAL

## 2024-01-17 ENCOUNTER — HOSPITAL ENCOUNTER (EMERGENCY)
Age: 29
Discharge: HOME | End: 2024-01-17
Attending: EMERGENCY MEDICINE
Payer: COMMERCIAL

## 2024-01-17 VITALS
BODY MASS INDEX: 27.44 KG/M2 | WEIGHT: 170 LBS | RESPIRATION RATE: 18 BRPM | TEMPERATURE: 98.2 F | DIASTOLIC BLOOD PRESSURE: 93 MMHG | OXYGEN SATURATION: 100 % | HEART RATE: 100 BPM | SYSTOLIC BLOOD PRESSURE: 132 MMHG

## 2024-01-17 DIAGNOSIS — B37.9 YEAST INFECTION: ICD-10-CM

## 2024-01-17 DIAGNOSIS — N83.201 RIGHT OVARIAN CYST: ICD-10-CM

## 2024-01-17 DIAGNOSIS — O36.80X0 PREGNANCY OF UNKNOWN ANATOMIC LOCATION: Primary | ICD-10-CM

## 2024-01-17 PROBLEM — O16.1 ELEVATED BLOOD PRESSURE AFFECTING PREGNANCY IN FIRST TRIMESTER, ANTEPARTUM: Status: ACTIVE | Noted: 2024-01-17

## 2024-01-17 LAB
ALBUMIN SERPL-MCNC: 4.4 G/DL (ref 3.5–5.2)
ALBUMIN/GLOB SERPL: 1.7 {RATIO} (ref 1–2.5)
ALP SERPL-CCNC: 44 U/L (ref 35–104)
ALT SERPL-CCNC: 15 U/L (ref 5–33)
ANION GAP SERPL CALCULATED.3IONS-SCNC: 11 MMOL/L (ref 9–17)
AST SERPL-CCNC: 18 U/L
B-HCG SERPL EIA 3RD IS-ACNC: 908.9 MIU/ML
BASOPHILS # BLD: 0.04 K/UL (ref 0–0.2)
BASOPHILS NFR BLD: 1 % (ref 0–2)
BILIRUB SERPL-MCNC: 0.6 MG/DL (ref 0.3–1.2)
BILIRUB UR QL STRIP: NEGATIVE
BUN SERPL-MCNC: 13 MG/DL (ref 6–20)
C TRACH DNA SPEC QL PROBE+SIG AMP: NEGATIVE
CALCIUM SERPL-MCNC: 9.3 MG/DL (ref 8.6–10.4)
CANDIDA SPECIES: POSITIVE
CHLORIDE SERPL-SCNC: 104 MMOL/L (ref 98–107)
CLARITY UR: CLEAR
CO2 SERPL-SCNC: 21 MMOL/L (ref 20–31)
COLOR UR: YELLOW
COMMENT: NORMAL
CREAT SERPL-MCNC: 0.5 MG/DL (ref 0.5–0.9)
EOSINOPHIL # BLD: 0.14 K/UL (ref 0–0.44)
EOSINOPHILS RELATIVE PERCENT: 2 % (ref 1–4)
ERYTHROCYTE [DISTWIDTH] IN BLOOD BY AUTOMATED COUNT: 13.3 % (ref 11.8–14.4)
GARDNERELLA VAGINALIS: NEGATIVE
GFR SERPL CREATININE-BSD FRML MDRD: >60 ML/MIN/1.73M2
GLUCOSE SERPL-MCNC: 110 MG/DL (ref 70–99)
GLUCOSE UR STRIP-MCNC: NEGATIVE MG/DL
HCT VFR BLD AUTO: 42.2 % (ref 36.3–47.1)
HGB BLD-MCNC: 14 G/DL (ref 11.9–15.1)
HGB UR QL STRIP.AUTO: NEGATIVE
IMM GRANULOCYTES # BLD AUTO: <0.03 K/UL (ref 0–0.3)
IMM GRANULOCYTES NFR BLD: 0 %
KETONES UR STRIP-MCNC: NEGATIVE MG/DL
LEUKOCYTE ESTERASE UR QL STRIP: NEGATIVE
LYMPHOCYTES NFR BLD: 2.64 K/UL (ref 1.1–3.7)
LYMPHOCYTES RELATIVE PERCENT: 39 % (ref 24–43)
MCH RBC QN AUTO: 28.6 PG (ref 25.2–33.5)
MCHC RBC AUTO-ENTMCNC: 33.2 G/DL (ref 28.4–34.8)
MCV RBC AUTO: 86.1 FL (ref 82.6–102.9)
MONOCYTES NFR BLD: 0.36 K/UL (ref 0.1–1.2)
MONOCYTES NFR BLD: 5 % (ref 3–12)
N GONORRHOEA DNA SPEC QL PROBE+SIG AMP: NEGATIVE
NEUTROPHILS NFR BLD: 53 % (ref 36–65)
NEUTS SEG NFR BLD: 3.6 K/UL (ref 1.5–8.1)
NITRITE UR QL STRIP: NEGATIVE
NRBC BLD-RTO: 0 PER 100 WBC
PH UR STRIP: 7 [PH] (ref 5–8)
PLATELET # BLD AUTO: 264 K/UL (ref 138–453)
PMV BLD AUTO: 9.8 FL (ref 8.1–13.5)
POTASSIUM SERPL-SCNC: 3.8 MMOL/L (ref 3.7–5.3)
PROT SERPL-MCNC: 7 G/DL (ref 6.4–8.3)
PROT UR STRIP-MCNC: NEGATIVE MG/DL
RBC # BLD AUTO: 4.9 M/UL (ref 3.95–5.11)
SODIUM SERPL-SCNC: 136 MMOL/L (ref 135–144)
SOURCE: ABNORMAL
SP GR UR STRIP: 1.02 (ref 1–1.03)
SPECIMEN DESCRIPTION: NORMAL
TRICHOMONAS: NEGATIVE
UROBILINOGEN UR STRIP-ACNC: NORMAL EU/DL (ref 0–1)
WBC OTHER # BLD: 6.8 K/UL (ref 3.5–11.3)

## 2024-01-17 PROCEDURE — 6370000000 HC RX 637 (ALT 250 FOR IP): Performed by: EMERGENCY MEDICINE

## 2024-01-17 PROCEDURE — 87491 CHLMYD TRACH DNA AMP PROBE: CPT

## 2024-01-17 PROCEDURE — 87660 TRICHOMONAS VAGIN DIR PROBE: CPT

## 2024-01-17 PROCEDURE — 87510 GARDNER VAG DNA DIR PROBE: CPT

## 2024-01-17 PROCEDURE — 81003 URINALYSIS AUTO W/O SCOPE: CPT

## 2024-01-17 PROCEDURE — 84702 CHORIONIC GONADOTROPIN TEST: CPT

## 2024-01-17 PROCEDURE — 80053 COMPREHEN METABOLIC PANEL: CPT

## 2024-01-17 PROCEDURE — 6370000000 HC RX 637 (ALT 250 FOR IP)

## 2024-01-17 PROCEDURE — 87591 N.GONORRHOEAE DNA AMP PROB: CPT

## 2024-01-17 PROCEDURE — 85025 COMPLETE CBC W/AUTO DIFF WBC: CPT

## 2024-01-17 PROCEDURE — 99284 EMERGENCY DEPT VISIT MOD MDM: CPT

## 2024-01-17 PROCEDURE — 87480 CANDIDA DNA DIR PROBE: CPT

## 2024-01-17 PROCEDURE — 76817 TRANSVAGINAL US OBSTETRIC: CPT

## 2024-01-17 RX ORDER — FLUCONAZOLE 50 MG/1
150 TABLET ORAL ONCE
Status: COMPLETED | OUTPATIENT
Start: 2024-01-17 | End: 2024-01-17

## 2024-01-17 RX ORDER — ACETAMINOPHEN 500 MG
1000 TABLET ORAL ONCE
Status: COMPLETED | OUTPATIENT
Start: 2024-01-17 | End: 2024-01-17

## 2024-01-17 RX ADMIN — ACETAMINOPHEN 1000 MG: 500 TABLET ORAL at 07:26

## 2024-01-17 RX ADMIN — FLUCONAZOLE 150 MG: 50 TABLET ORAL at 12:31

## 2024-01-17 ASSESSMENT — PAIN SCALES - GENERAL: PAINLEVEL_OUTOF10: 5

## 2024-01-17 ASSESSMENT — ENCOUNTER SYMPTOMS
SHORTNESS OF BREATH: 0
NAUSEA: 1
RHINORRHEA: 0
COUGH: 0
VOMITING: 0
ABDOMINAL PAIN: 1

## 2024-01-17 ASSESSMENT — PAIN - FUNCTIONAL ASSESSMENT: PAIN_FUNCTIONAL_ASSESSMENT: 0-10

## 2024-01-17 NOTE — ED NOTES
Pt presents to the ED ambulatory through triage with c/o RT sided pelvic pain. Pt states she is 5 weeks pregnant per home pregnancy test. LMP 12/11/23 per pt. Pt states she called her OB and was told to go to the ER. Pt states the pain started Monday and has gotten worse. Pt denies vaginal bleeding, discharge, or odor. Pt denies dysuria. Pt states she has a hx of miscarriage and D&C. Pt A&Ox4, RR even and unlabored. ED resident at bedside. Call light within reach.

## 2024-01-17 NOTE — ED NOTES
The following labs were labeled with appropriate pt sticker and tubed to lab:     [] Blue     [x] Lavender   [] on ice  [x] Green/yellow  [x] Green/black [] on ice  [] Chavez  [] on ice  [x] Yellow  [] Red  [] Pink  [] Type/ Screen  [] ABG  [] VBG    [] COVID-19 swab    [] Rapid  [] PCR  [] Flu swab  [] Peds Viral Panel     [] Urine Sample  [] Fecal Sample  [] Pelvic Cultures  [] Blood Cultures  [] X 2  [] STREP Cultures  [] Wound Cultures

## 2024-01-17 NOTE — ED NOTES
The following labs were labeled with appropriate pt sticker and tubed to lab:     [] Blue     [] Lavender   [] on ice  [] Green/yellow  [] Green/black [] on ice  [] Grey  [] on ice  [] Yellow  [] Red  [] Pink  [] Type/ Screen  [] ABG  [] VBG    [] COVID-19 swab    [] Rapid  [] PCR  [] Flu swab  [] Peds Viral Panel     [] Urine Sample  [] Fecal Sample  [x] Pelvic Cultures  [] Blood Cultures  [] X 2  [] STREP Cultures  [] Wound Cultures

## 2024-01-17 NOTE — CONSULTS
OB/GYN Consult  [facility]    Patient Name: Renate Mcbride     Patient : 1995  Room/Bed: 46PED/46PED  Admission Date/Time: 2024  6:13 AM  Primary Care Physician: No primary care provider on file.    Consulting Provider: Dr. Mclaughlin  Reason for Consult: Pregnancy of unknown location    CC:   Chief Complaint   Patient presents with    Pelvic Pain                HPI: Renate Mcbride is a 28 y.o. female  presents to the ED, c/o left pelvic pain in the setting of a positive pregnancy test. Patient reports that she has been having right pelvic discomfort for 2 days. It is not sharp, but rather dull and aching. Quantitative beta Hcg 908. TVUS performed in ED showed no IUP or evidence of an adnexal mass. Small simple cyst located on her right ovary. Free fluid noted in her left cul de sac. Denies vaginal bleeding, dizziness, or urinary symptoms. Patient's last menstrual period was 2023 (exact date).     REVIEW OF SYSTEMS:   A minimum of an eleven point review of systems was completed.    REVIEW OF SYSTEMS:   Constitutional: negative fever, negative chills, negative weight changes   HEENT: negative visual disturbances, negative headaches, negative dizziness, negative hearing loss  Respiratory: negative dyspnea, negative cough, negative SOB  Cardiovascular: negative chest pain,  negative palpitations, negative arrhythmia, negative syncope   Gastrointestinal: positive right pelvic pain, negative RUQ pain, negative N/V, negative diarrhea, negative constipation, negative bowel changes, negative heartburn   Genitourinary: negative dysuria, negative hematuria, negative urinary incontinence, negative vaginal discharge, negative vaginal bleeding or spotting  Dermatological: negative rash, negative pruritis, negative mole or other skin changes  Hematologic: negative bruising  Immunologic/Lymphatic: negative recent illness, negative recent sick contact  Musculoskeletal: negative back pain,

## 2024-01-17 NOTE — DISCHARGE INSTRUCTIONS
Follow-up in 2 days for repeat hCG level, and repeat ultrasound in 1 week, return to ER for worsening abdominal pain.

## 2024-01-17 NOTE — ED PROVIDER NOTES
[facility]     Emergency Department     Faculty Attestation  6:45 AM EST      I performed a history and physical examination of the patient and discussed management with the resident. I have reviewed and agree with the resident’s findings including all diagnostic interpretations, and treatment plans as written. Any areas of disagreement are noted on the chart. I was personally present for the key portions of any procedures. I have documented in the chart those procedures where I was not present during the key portions. I have reviewed the emergency nurses triage note. I agree with the chief complaint, past medical history, past surgical history, allergies, medications, social and family history as documented unless otherwise noted below. Documentation of the HPI, Physical Exam and Medical Decision Making performed by scribes is based on my personal performance of the HPI, PE and MDM. For Physician Assistant/ Nurse Practitioner cases/documentation I have personally evaluated this patient and have completed at least one if not all key elements of the E/M (history, physical exam, and MDM). Additional findings are as noted.    , 2 previous spontaneous miscarriage.  Staretd having R lower abdominal pain 2 days ago, no nausea or vomiting, no fevers or chills, no diarrhea or constipation, LMP was 12/11. With + home preg test.  No vaginal bleeding or discharge, she feels pain in lower abdominal radiating into her groin  Patient on exam with pain has some tenderness in the RLQ, negative rosvings time. Non peritoneal.  Resident to perform pelvic exam    Check UA, hcg quant, bimanual exam  Likely US, r/o ectopic v possible appendicitis,  Tylenol for pain contorl  Cbc.cmp    Concha Mclaughlin D.O, M.P.H  Attending Emergency Medicine Physician         Concha Mclaughlin, DO  [phone]    
      PHYSICAL EXAM      INITIAL VITALS:   BP (!) 132/93   Pulse 100   Temp 98.2 °F (36.8 °C) (Oral)   Resp 18   Wt 77.1 kg (170 lb)   LMP 12/11/2023 (Exact Date)   SpO2 100%   BMI 27.44 kg/m²     Physical Exam  Constitutional:       General: She is in acute distress.      Appearance: Normal appearance.   HENT:      Head: Normocephalic.      Nose: No congestion or rhinorrhea.      Mouth/Throat:      Mouth: Mucous membranes are moist.   Eyes:      Extraocular Movements: Extraocular movements intact.   Cardiovascular:      Rate and Rhythm: Normal rate and regular rhythm.      Heart sounds: Normal heart sounds.   Pulmonary:      Effort: Pulmonary effort is normal.      Breath sounds: Normal breath sounds. No wheezing.   Abdominal:      Palpations: Abdomen is soft.      Tenderness: There is abdominal tenderness. There is no right CVA tenderness or left CVA tenderness.      Comments: RLQ tenderness on palpation    Musculoskeletal:      Right lower leg: No edema.      Left lower leg: No edema.   Neurological:      Mental Status: She is alert and oriented to person, place, and time.   Psychiatric:         Mood and Affect: Mood normal.           DDX/DIAGNOSTIC RESULTS / EMERGENCY DEPARTMENT COURSE / MDM     Medical Decision Making  28-year-old female G4, P1 presents with right lower quadrant abdominal pain radiating to the pelvic area.  Recent positive pregnancy test at home.  History of preeclampsia with first pregnancy, vaginal delivery with no complications.  2 miscarriages with the last required D&C.  Denies any vaginal discharge or spotting.  No urinary symptoms.  Plan for a pelvic exam as well as a CBC, CMP, transvaginal ultrasound, beta-hCG quantitative as well as a vaginal swabs for STIs.  Urinalysis.  Differential diagnosis includes intrauterine pregnancy ectopic pregnancy, pregnancy up and show location, appendicitis miscarriage.    Amount and/or Complexity of Data Reviewed  Labs: ordered. Decision-making

## 2024-01-18 ENCOUNTER — TELEPHONE (OUTPATIENT)
Dept: OBGYN CLINIC | Age: 29
End: 2024-01-18

## 2024-01-18 ENCOUNTER — CARE COORDINATION (OUTPATIENT)
Dept: OTHER | Facility: CLINIC | Age: 29
End: 2024-01-18

## 2024-01-18 NOTE — CARE COORDINATION
ACM reviewed this patient's records r/t discharge assignment. Patient was in the ED yesterday for RLQ and pelvic pain with positive UPT last week. Beta hCG is 908, transvaginal US shows no gestational sac. Unable to r/o ectopic due to possibility of very early pregnancy. Patient to trend beta hCG and repeat imaging. ACM will do a chart review within 2 business days for repeat labs and make outreach once pregnancy viability and location are determined. No urgent needs identified at this time as workup for appendicitis was negative and physical assessment is reassuring at this time.       ILIA JuarezN, RN  Associate Care Manager   Cell: [phone]  [email or web address]

## 2024-01-18 NOTE — TELEPHONE ENCOUNTER
Renate Mcbride calling reporting a positive pregnancy test.    Renate Mcbride is  a new patient.     This is not Renate Mcbride's first pregnancy.    ** Please complete if patient has previous birth history, please delete is not applicable**  Number of pregnancies: 4  Mode of delivery (vaginal//both) vaginal-1  If NEW patient please instruct patient will need prior OBGYN records.     Renate Mcbride last menstrual cycle: 23  Based on LMP patient is 5w3d weeks     Dose patient have any concerns?   [x]YES-miscarriage  []NO  If yes, please discuss concern with provider to determine if patient needs additional appointment.      Scheduling Instructions and Education  [x]If patient less than 8 weeks please schedule missed menses (30 mins) between 6-8 weeks. ALSO scheduled USN w/ IPV (w/ NURSE) to follow 2-3 weeks after missed menses   Patient education: Initial missed menses appointment will consist of a pregnancy test and to establish care and review previous births **if applicable**. There will NOT be an ultrasound at this first visit. Please review that the USN and IPV visit will be 2-3 weeks after patient's missed menses. At this appointment dating ultrasound will be complete, prenatal labs ordered, prenatal education completed, pelvic exam if indicated     Please document appointments scheduled during phone call   Missed menses date/provider:  2:00 rocio  USN/IPV date:2/15 3:15 usn 4:00 ipv  PT SAW DR. JONES IN ER ON  HAD HCG     Please forward telephone encounter to provider appointments are scheduled with prior to closing telephone encounter.

## 2024-01-19 ENCOUNTER — HOSPITAL ENCOUNTER (OUTPATIENT)
Age: 29
Setting detail: SPECIMEN
Discharge: HOME | End: 2024-01-19

## 2024-01-19 DIAGNOSIS — O36.80X0 PREGNANCY OF UNKNOWN ANATOMIC LOCATION: ICD-10-CM

## 2024-01-20 LAB — B-HCG SERPL EIA 3RD IS-ACNC: 2134 MIU/ML

## 2024-01-21 ENCOUNTER — HOSPITAL ENCOUNTER (OUTPATIENT)
Age: 29
Discharge: HOME | End: 2024-01-21
Payer: COMMERCIAL

## 2024-01-21 DIAGNOSIS — O36.80X0 PREGNANCY OF UNKNOWN ANATOMIC LOCATION: ICD-10-CM

## 2024-01-21 LAB — B-HCG SERPL EIA 3RD IS-ACNC: 4842 MIU/ML (ref 0–7)

## 2024-01-21 PROCEDURE — 84702 CHORIONIC GONADOTROPIN TEST: CPT

## 2024-01-21 PROCEDURE — 36415 COLL VENOUS BLD VENIPUNCTURE: CPT

## 2024-01-23 ENCOUNTER — CARE COORDINATION (OUTPATIENT)
Dept: OTHER | Facility: CLINIC | Age: 29
End: 2024-01-23

## 2024-01-23 NOTE — CARE COORDINATION
Ambulatory Care Coordination Note    ACM attempted to reach patient for introduction to Associate Care Management related to Maternity CM. HIPAA compliant message left requesting a return phone call at patient convenience.     Plan for follow-up call in 7-10 days      Future Appointments   Date Time Provider Department Center   1/26/2024 10:30 AM [facility]   2/8/2024  2:00 PM Irasema Wu, KARINA - ZAIRA [facility]   2/15/2024  3:15 PM [facility]  [facility]   2/15/2024  4:00 PM [facility]       TONIA Juarez, RN  Associate Care Manager   Cell: [phone]  [email or web address]

## 2024-02-08 ENCOUNTER — HOSPITAL ENCOUNTER (OUTPATIENT)
Age: 29
Setting detail: SPECIMEN
Discharge: HOME | End: 2024-02-08

## 2024-02-08 ENCOUNTER — OFFICE VISIT (OUTPATIENT)
Dept: OBGYN CLINIC | Age: 29
End: 2024-02-08

## 2024-02-08 VITALS
WEIGHT: 180 LBS | SYSTOLIC BLOOD PRESSURE: 128 MMHG | HEIGHT: 66 IN | BODY MASS INDEX: 28.93 KG/M2 | DIASTOLIC BLOOD PRESSURE: 82 MMHG

## 2024-02-08 DIAGNOSIS — N91.2 AMENORRHEA: Primary | ICD-10-CM

## 2024-02-08 DIAGNOSIS — N91.2 AMENORRHEA: ICD-10-CM

## 2024-02-08 DIAGNOSIS — R11.0 NAUSEA: ICD-10-CM

## 2024-02-08 LAB
AMPHET UR QL SCN: NEGATIVE
BARBITURATES UR QL SCN: NEGATIVE
BENZODIAZ UR QL: NEGATIVE
CANNABINOIDS UR QL SCN: NEGATIVE
COCAINE UR QL SCN: NEGATIVE
FENTANYL UR QL: NEGATIVE
METHADONE UR QL: NEGATIVE
OPIATES UR QL SCN: NEGATIVE
OXYCODONE UR QL SCN: NEGATIVE
PCP UR QL SCN: NEGATIVE
TEST INFORMATION: NORMAL

## 2024-02-08 RX ORDER — ONDANSETRON 4 MG/1
4 TABLET, FILM COATED ORAL DAILY PRN
Qty: 30 TABLET | Refills: 0 | Status: SHIPPED | OUTPATIENT
Start: 2024-02-08

## 2024-02-08 SDOH — ECONOMIC STABILITY: INCOME INSECURITY: HOW HARD IS IT FOR YOU TO PAY FOR THE VERY BASICS LIKE FOOD, HOUSING, MEDICAL CARE, AND HEATING?: NOT HARD AT ALL

## 2024-02-08 SDOH — ECONOMIC STABILITY: FOOD INSECURITY: WITHIN THE PAST 12 MONTHS, YOU WORRIED THAT YOUR FOOD WOULD RUN OUT BEFORE YOU GOT MONEY TO BUY MORE.: NEVER TRUE

## 2024-02-08 SDOH — ECONOMIC STABILITY: HOUSING INSECURITY
IN THE LAST 12 MONTHS, WAS THERE A TIME WHEN YOU DID NOT HAVE A STEADY PLACE TO SLEEP OR SLEPT IN A SHELTER (INCLUDING NOW)?: NO

## 2024-02-08 SDOH — ECONOMIC STABILITY: FOOD INSECURITY: WITHIN THE PAST 12 MONTHS, THE FOOD YOU BOUGHT JUST DIDN'T LAST AND YOU DIDN'T HAVE MONEY TO GET MORE.: NEVER TRUE

## 2024-02-08 ASSESSMENT — PATIENT HEALTH QUESTIONNAIRE - PHQ9
SUM OF ALL RESPONSES TO PHQ QUESTIONS 1-9: 0
2. FEELING DOWN, DEPRESSED OR HOPELESS: 0
1. LITTLE INTEREST OR PLEASURE IN DOING THINGS: 0
SUM OF ALL RESPONSES TO PHQ QUESTIONS 1-9: 0
SUM OF ALL RESPONSES TO PHQ9 QUESTIONS 1 & 2: 0

## 2024-02-08 NOTE — PROGRESS NOTES
[facility] [address]  [facility] [address]  Dept: [phone]    Missed Menses Intake    Subjective:    Patient Name:Renate Mcbride  Patient Age: 28 y.o.  Date of Visit: 2024    Renate Mcbride arrives for missed menses visit.   Renate Mcbride had a positive pregnancy test    Renate Mcbride does have concerns. Previously in ER for right sided pelvic pain   Planned Pregnancy: Yes  Partner/FOB name: Ty-   Patient's last menstrual period was 2023 (exact date)., Regular menses: Yes  Estimate gestation age of LMP: 8w3d  Estimated due date based on LMP: 24  Patient Occupation: PT at [employer]    OBPhonetimeN History:   Date of Last Pap Smear: 2020 normal   Number of Pregnancies: 4  Number of Live Births: 1  [x] Vaginal Birth  []  Birth  [] Vaginal Birth after  delivery ()  [x] History of High Risk Pregnancy(ies)  [x] History of Birth Complications  [] Hx of infant with NICU stay    Past Medical History  Diabetes: No  Anxiety: No  Depression: No  Bipolar: No  High Blood Pressure:No  Stroke: No  Seizure: No  Deep Vein Thrombosis: No  Preeclampsia: Yes  Gestational Diabetes:No  Gestational Hypertension:No  Postpartum Hemorrhage: No  Bleeding Disorder: No  Sexually Transmitted Infections: No  Genital Herpes: No  History of chicken pox/varicella vaccine: Yes  Daily Medications: Yes  Prenatal and zofran     Past Family History (first degree relative)  Family history of blood clots: Yes- mother  Family history of diabetes:No  Family history of factor V: No  Family history (mother/sister) of preeclampsia in a previous pregnancy: No    Early 1 Hour Glucose Screening  [] BMI 30 or greater (if marked, positive screen)  Risk Factors (need more than 1 if BMI less than 30)  [] Physical inactivity   [] First degree relative with diabetes  [] High- risk race or ethnicity (, , ,

## 2024-02-08 NOTE — PROGRESS NOTES
[facility] OB/GYN [address]  [facility] [address]  Dept: [phone]    Patient Name: Renate Mcbride  Patient Age: 28 y.o.  Date of Visit: 2024    SUBJECTIVE:    Chief Complaint:  Chief Complaint   Patient presents with    Amenorrhea       HPI:    Renate  is being seen today for missed menses.  This  is a planned pregnancy.  She is  accepting at this time.  LMP: Patient's last menstrual period was 2023 (exact date). Sure and definite: Yes    28 day cycle: Yes    She was not on a contraceptive at the time of conception.  Estimated weeks gestation today : 8w3d  Tentative CARITO: 24    Relationship with FOB: Ty    Patient reports concerns: yes - nausea    OB History          4    Para   1    Term   1            AB   2    Living   1         SAB   2    IAB        Ectopic        Molar        Multiple   0    Live Births   1              Past Medical History:   Diagnosis Date    Stomach ulcer      Current Outpatient Medications   Medication Sig Dispense Refill    ondansetron (ZOFRAN) 4 MG tablet Take 1 tablet by mouth daily as needed for Nausea or Vomiting 30 tablet 0    ondansetron (ZOFRAN-ODT) 4 MG disintegrating tablet Take 1 tablet by mouth 3 times daily as needed for Nausea or Vomiting 21 tablet 0    Prenatal Vit w/Iu-Dhrrtreqw-ZE (PNV PO) Take by mouth      ibuprofen (ADVIL;MOTRIN) 600 MG tablet Take 1 tablet by mouth every 6 hours as needed for Pain (Patient not taking: Reported on 2024) 30 tablet 0     No current facility-administered medications for this visit.         OBJECTIVE:    /82 (Site: Right Upper Arm, Position: Sitting, Cuff Size: Medium Adult)   Ht 1.676 m (5' 6\")   Wt 81.6 kg (180 lb)   LMP 2023 (Exact Date)   BMI 29.05 kg/m²       She is complaining today of:   Pain: No  Cramping: No  Bleeding or spotting: No    Nausea: Yes  Vomiting: Yes    Breast enlargement/tenderness:

## 2024-02-09 LAB
C TRACH DNA SPEC QL PROBE+SIG AMP: NEGATIVE
N GONORRHOEA DNA SPEC QL PROBE+SIG AMP: NEGATIVE
SPECIMEN DESCRIPTION: NORMAL

## 2024-02-13 ENCOUNTER — CARE COORDINATION (OUTPATIENT)
Dept: OTHER | Facility: CLINIC | Age: 29
End: 2024-02-13

## 2024-02-13 NOTE — CARE COORDINATION
Ambulatory Care Coordination Note    ACM attempted 2nd outreach to patient for introduction to Associate Care Management related to Maternity CM. HIPAA compliant message left requesting a return phone call at patient convenience.     Unable to Reach Letter sent to patient via Anywhere [employer].    Will continue to outreach.      Future Appointments   Date Time Provider Department Center   2/15/2024  3:15 PM [facility] MARTINEZ SHER[facility]   2/15/2024  4:00 PM SCHEDULE, DARINEL HENRIQUEZ [facility] N[facility]       TONIA Juarez, RN  Associate Care Manager   Cell: [phone]  [email or web address]

## 2024-02-15 ENCOUNTER — INITIAL PRENATAL (OUTPATIENT)
Dept: OBGYN CLINIC | Age: 29
End: 2024-02-15

## 2024-02-15 ENCOUNTER — HOSPITAL ENCOUNTER (OUTPATIENT)
Age: 29
Setting detail: SPECIMEN
Discharge: HOME | End: 2024-02-15

## 2024-02-15 VITALS
DIASTOLIC BLOOD PRESSURE: 78 MMHG | BODY MASS INDEX: 28.89 KG/M2 | WEIGHT: 179 LBS | HEART RATE: 91 BPM | SYSTOLIC BLOOD PRESSURE: 124 MMHG

## 2024-02-15 DIAGNOSIS — Z3A.09 9 WEEKS GESTATION OF PREGNANCY: Primary | ICD-10-CM

## 2024-02-15 RX ORDER — ASPIRIN 81 MG/1
81 TABLET ORAL DAILY
Qty: 90 TABLET | Refills: 1 | Status: SHIPPED | OUTPATIENT
Start: 2024-02-15

## 2024-02-15 NOTE — PROGRESS NOTES
New Obstetric Intake     Patient Name:Renate Mcbride  Patient Age: 28 y.o.  Date of Visit: 2/15/2024  Patient's estimated gestational age at visit: 9w3d  Estimated Date of Delivery: 2024    Subjective:    Any Concerns Today: no    OB History          4    Para   1    Term   1            AB   2    Living   1         SAB   2    IAB        Ectopic        Molar        Multiple   0    Live Births   1                Aspers Score:   Postpartum Depression: Not on file (2021)      History of postpartum depression: no    Generalized Anxiety Screenin/15/2024     3:43 PM   ARIN 7 SCORE   ARIN-7 Total Score 0     Interpretation of ARIN-7 score: 5-9 = mild anxiety, 10-14 = moderate anxiety, 15+ = severe anxiety. Recommend referral to behavioral health for scores 10 or greater.     Social Determinants of Health:   Financial Resource Strain: Low Risk  (2/15/2024)    Overall Financial Resource Strain (CARDIA)     Difficulty of Paying Living Expenses: Not hard at all      Food Insecurity: No Food Insecurity (2024)    Hunger Vital Sign     Worried About Running Out of Food in the Last Year: Never true     Ran Out of Food in the Last Year: Never true      Transportation Needs: No Transportation Needs (2/15/2024)    PRAPARE - Transportation     Lack of Transportation (Medical): No     Lack of Transportation (Non-Medical): No      Intimate Partner Violence: Not At Risk (2/15/2024)    Humiliation, Afraid, Rape, and Kick questionnaire     Fear of Current or Ex-Partner: No     Emotionally Abused: No     Physically Abused: No     Sexually Abused: No       Objective:      /78   Pulse 91   Wt 81.2 kg (179 lb)   LMP 2023 (Exact Date)      Medications:  Current Outpatient Medications   Medication Sig Dispense Refill    ondansetron (ZOFRAN) 4 MG tablet Take 1 tablet by mouth daily as needed for Nausea or Vomiting 30 tablet 0    Prenatal Vit w/Go-Yuvahuwpn-RP (PNV PO) Take by mouth

## 2024-02-16 DIAGNOSIS — Z3A.09 9 WEEKS GESTATION OF PREGNANCY: ICD-10-CM

## 2024-02-17 LAB
MICROORGANISM SPEC CULT: NORMAL
SPECIMEN DESCRIPTION: NORMAL

## 2024-02-20 DIAGNOSIS — Z3A.10 10 WEEKS GESTATION OF PREGNANCY: Primary | ICD-10-CM

## 2024-02-23 ENCOUNTER — HOSPITAL ENCOUNTER (OUTPATIENT)
Age: 29
Setting detail: SPECIMEN
Discharge: HOME | End: 2024-02-23

## 2024-02-23 DIAGNOSIS — Z3A.09 9 WEEKS GESTATION OF PREGNANCY: ICD-10-CM

## 2024-02-23 LAB
ABO + RH BLD: NORMAL
ALBUMIN SERPL-MCNC: 4.1 G/DL (ref 3.5–5.2)
ALBUMIN/GLOB SERPL: 1.7 {RATIO} (ref 1–2.5)
ALP SERPL-CCNC: 47 U/L (ref 35–104)
ALT SERPL-CCNC: 25 U/L (ref 5–33)
ANION GAP SERPL CALCULATED.3IONS-SCNC: 15 MMOL/L (ref 9–17)
AST SERPL-CCNC: 20 U/L
BASOPHILS # BLD: 0.03 K/UL (ref 0–0.2)
BASOPHILS NFR BLD: 0 % (ref 0–2)
BILIRUB SERPL-MCNC: 0.5 MG/DL (ref 0.3–1.2)
BLOOD GROUP ANTIBODIES SERPL: NEGATIVE
BUN SERPL-MCNC: 8 MG/DL (ref 6–20)
CALCIUM SERPL-MCNC: 9.4 MG/DL (ref 8.6–10.4)
CHLORIDE SERPL-SCNC: 102 MMOL/L (ref 98–107)
CO2 SERPL-SCNC: 21 MMOL/L (ref 20–31)
CREAT SERPL-MCNC: 0.4 MG/DL (ref 0.5–0.9)
CREAT UR-MCNC: 109.9 MG/DL (ref 28–217)
EOSINOPHIL # BLD: 0.12 K/UL (ref 0–0.44)
EOSINOPHILS RELATIVE PERCENT: 1 % (ref 1–4)
ERYTHROCYTE [DISTWIDTH] IN BLOOD BY AUTOMATED COUNT: 12.8 % (ref 11.8–14.4)
GFR SERPL CREATININE-BSD FRML MDRD: >60 ML/MIN/1.73M2
GLUCOSE SERPL-MCNC: 99 MG/DL (ref 70–99)
HBV SURFACE AG SERPL QL IA: NONREACTIVE
HCT VFR BLD AUTO: 40.1 % (ref 36.3–47.1)
HCV AB SERPL QL IA: NONREACTIVE
HGB BLD-MCNC: 13.3 G/DL (ref 11.9–15.1)
HIV 1+2 AB+HIV1 P24 AG SERPL QL IA: NONREACTIVE
IMM GRANULOCYTES # BLD AUTO: <0.03 K/UL (ref 0–0.3)
IMM GRANULOCYTES NFR BLD: 0 %
LYMPHOCYTES NFR BLD: 2.57 K/UL (ref 1.1–3.7)
LYMPHOCYTES RELATIVE PERCENT: 25 % (ref 24–43)
MCH RBC QN AUTO: 29.2 PG (ref 25.2–33.5)
MCHC RBC AUTO-ENTMCNC: 33.2 G/DL (ref 28.4–34.8)
MCV RBC AUTO: 88.1 FL (ref 82.6–102.9)
MONOCYTES NFR BLD: 0.54 K/UL (ref 0.1–1.2)
MONOCYTES NFR BLD: 5 % (ref 3–12)
NEUTROPHILS NFR BLD: 69 % (ref 36–65)
NEUTS SEG NFR BLD: 6.9 K/UL (ref 1.5–8.1)
NRBC BLD-RTO: 0 PER 100 WBC
PLATELET # BLD AUTO: 275 K/UL (ref 138–453)
PMV BLD AUTO: 10.6 FL (ref 8.1–13.5)
POTASSIUM SERPL-SCNC: 4.2 MMOL/L (ref 3.7–5.3)
PROT SERPL-MCNC: 6.5 G/DL (ref 6.4–8.3)
RBC # BLD AUTO: 4.55 M/UL (ref 3.95–5.11)
RUBV IGG SERPL QL IA: 41.81 IU/ML
SODIUM SERPL-SCNC: 138 MMOL/L (ref 135–144)
T PALLIDUM AB SER QL IA: NONREACTIVE
TOTAL PROTEIN, URINE: 10 MG/DL
URINE TOTAL PROTEIN CREATININE RATIO: 0.09 (ref 0–0.2)
WBC OTHER # BLD: 10.2 K/UL (ref 3.5–11.3)

## 2024-02-26 LAB — VZV IGG SER QL IA: 0.47

## 2024-03-15 ENCOUNTER — ROUTINE PRENATAL (OUTPATIENT)
Dept: OBGYN CLINIC | Age: 29
End: 2024-03-15

## 2024-03-15 VITALS
DIASTOLIC BLOOD PRESSURE: 84 MMHG | WEIGHT: 178 LBS | SYSTOLIC BLOOD PRESSURE: 124 MMHG | BODY MASS INDEX: 28.61 KG/M2 | HEIGHT: 66 IN

## 2024-03-15 DIAGNOSIS — Z34.90 NORMAL REPEAT PREGNANCY, ANTEPARTUM: Primary | ICD-10-CM

## 2024-03-15 DIAGNOSIS — Z3A.13 13 WEEKS GESTATION OF PREGNANCY: ICD-10-CM

## 2024-03-15 DIAGNOSIS — O09.299 HX OF PREECLAMPSIA, PRIOR PREGNANCY, CURRENTLY PREGNANT: ICD-10-CM

## 2024-03-15 DIAGNOSIS — O21.9 NAUSEA/VOMITING IN PREGNANCY: ICD-10-CM

## 2024-03-15 RX ORDER — ONDANSETRON 4 MG/1
4 TABLET, FILM COATED ORAL DAILY PRN
Qty: 30 TABLET | Refills: 0 | Status: SHIPPED | OUTPATIENT
Start: 2024-03-15

## 2024-03-15 NOTE — PROGRESS NOTES
Chaperone for Intimate Exam  Chaperone was offered as part of the rooming process. Patient declined and agrees to continue with exam without a chaperone.  Chaperone: n/a    Modified Pregnancy-Unique Quantification of Emesis and Nausea    Renate Mcbride is  taking any antiemetic medication   If yes what medication: zofran     On average in a day, for how long do you feel nauseated or sick to your stomach?  [] Not at all (1)  [] 1 hr or less (2)  [] 2-3 hrs (3)  [] 4-6 hrs (4)  [x] More than 6 hrs (5)  On average in a day, how many times do you vomit or throw up?  [] 7 or more times (5)  [] 5-6 times (4)  [] 3-4 times (3)  [] 1-2 times (2)  [x] I did not throw up (1)  On average in a day, how many times do you have retching or dry heaves without bringing anything up?  [x] None (1)  [] 1-2 times (2)  [] 3-4 times (3)  [] 5-6 times (4)  [] 7 or more times (5)    Total: 7 moderate nausea and vomiting in pregnancy    Mild NVP: 6 or less  Moderate NVP: 7-12  Severe NVP: 13 or more  (Adapted from ACOG Practice Bulletin 189)

## 2024-03-15 NOTE — PROGRESS NOTES
SUBJECTIVE:    Renate is here for her return OB visit. Requesting refill on zofran continued daily nausea.   She reports  feeling fetal movement.  She denies vaginal bleeding.  She denies vaginal discharge.  She denies leaking of fluid.  She denies uterine cramping.  She denies  nausea and/or vomiting.    OBJECTIVE:  Blood pressure 124/84, height 1.676 m (5' 6\"), weight 80.7 kg (178 lb), last menstrual period 12/11/2023, not currently breastfeeding.    Total weight gain: 5.897 kg (13 lb)        ASSESSMENT/PLAN:  IUP @ 13+4 weeks  S=D    Normal Repeat Pregnancy  Due date is based on LMP and confirmed with 9w1d early dating ultrasound  Patient's prenatal labs are completed.  Patient's blood type A negative and rhogam is  indicated in the pregnancy.   Early glucola indicated: no  Patient Accepted  genetic screening.   Accepted and cell free DNA testingand test(s) are low risk trisomy 21/18/16 (NIPT)  Anatomy scan scheduled   Total weight gain in pregnancy reviewed: Yes  Fetal movement was reviewed.    2. 13 weeks gestation of pregnancy      3. Nausea/vomiting in pregnancy  - PUQE score moderate. Continued nausea. Requiring zofran daily.  - ondansetron (ZOFRAN) 4 MG tablet; Take 1 tablet by mouth daily as needed for Nausea or Vomiting  Dispense: 30 tablet; Refill: 0    4. Hx of preeclampsia, prior pregnancy, currently pregnant  - taking baby asa daily         She was counseled regarding all of the above:    Return in about 4 weeks (around 4/12/2024) for Return OB.    The patient, Renate Mcbride was seen for 25 minutes were spent on this encounter on the date of service by the provider.         Electronically Signed By: KARINA Diaz CNM

## 2024-04-11 ENCOUNTER — ROUTINE PRENATAL (OUTPATIENT)
Dept: OBGYN CLINIC | Age: 29
End: 2024-04-11

## 2024-04-11 ENCOUNTER — HOSPITAL ENCOUNTER (OUTPATIENT)
Age: 29
Setting detail: SPECIMEN
Discharge: HOME | End: 2024-04-11

## 2024-04-11 VITALS — BODY MASS INDEX: 29.7 KG/M2 | SYSTOLIC BLOOD PRESSURE: 120 MMHG | WEIGHT: 184 LBS | DIASTOLIC BLOOD PRESSURE: 80 MMHG

## 2024-04-11 DIAGNOSIS — Z3A.17 17 WEEKS GESTATION OF PREGNANCY: ICD-10-CM

## 2024-04-11 DIAGNOSIS — O16.2 ELEVATED BLOOD PRESSURE AFFECTING PREGNANCY IN SECOND TRIMESTER, ANTEPARTUM: ICD-10-CM

## 2024-04-11 DIAGNOSIS — Z34.90 NORMAL REPEAT PREGNANCY, ANTEPARTUM: Primary | ICD-10-CM

## 2024-04-11 DIAGNOSIS — O09.299 HX OF PREECLAMPSIA, PRIOR PREGNANCY, CURRENTLY PREGNANT: ICD-10-CM

## 2024-04-11 PROCEDURE — 0502F SUBSEQUENT PRENATAL CARE: CPT | Performed by: NURSE PRACTITIONER

## 2024-04-11 NOTE — PROGRESS NOTES
Presents for OB visit  Gestation 17w3d  Estimated Date of Delivery: 24    Insurance: [facility]    IPV bag/mug given:2/15/2024 Tamara Lepe LPN   Genetic Information given/reviewed: 2024 KARINA Diaz CNM   1st trimester education packet given: 2024 KARINA Diaz CNM   2nd trimester education packet given:  3rd trimester education packet given:      FOB Name:    Last Pap Smear:  declining pap until pp   [x] Urine collected for culture 2/15/2024 Tamara Lepe LPN    [x] Negative date 2/15/24   [] Positive date **  [x] UDS collected 24  [x] Gc/ct collected 24  [x] Prenatal Labs completed     Genetic Screening:  [x]Accepted NIPS 2024 KARINA Diaz CNM 2024 Tamara Lepe LPN   [x] Low risk       Carrier Screening:  [x] Known negative CF ONLY NO SMA/Fragile X    Baby aspirin screen:  [x]Positive (hx preE)  []Negative    Early 1 hour GTT:  []Yes  [x] No    AFP:  []Ordered  []Completed    Anatomy scan:  [x]Referral Sent 2/15/2024 Tamara Lepe LPN   [x]Scheduled 24  []Completed  [] Follow up **    Fetal Echo:   [] Yes  [] No    High Risk Factors:  Hx miscarriage x2  Hx preE  -baseline labs at [facility]  - baby asa at 13 weeks     []Placed on High Risk List    Fetal Surveillance:  [] Yes  [] No    Covid Vaccine:DECLINED 2/15/2024 Tamara Lepe LPN   Flu Vaccine:  [x]Given **  [] Declined **  Tdap:  []Given **  [] Declined **  Rhogam:  []Given **  [] Not indicated     Varicella: non immune     1hr GTT:  [] Results **  3hr GTT:    []Breast Pump Order Signed/Sent    36 weeks US:  []Completed     GBS:  [] Positive   [] Negative from **    Apts with :  [] Date: ** Gestational Age: **  [] Date: ** Gestational Age: **    Apts with :  [] Date: ** Gestational Age: **  [] Date: ** Gestational Age: **    Depression/Anxiety screening (date/results/sign off)  1st trimester 2/15/2024 Tamara Lepe LPN   *EPDS score:0  *GAD7

## 2024-04-12 DIAGNOSIS — Z3A.17 17 WEEKS GESTATION OF PREGNANCY: ICD-10-CM

## 2024-04-12 DIAGNOSIS — O09.299 HX OF PREECLAMPSIA, PRIOR PREGNANCY, CURRENTLY PREGNANT: ICD-10-CM

## 2024-04-12 DIAGNOSIS — O16.2 ELEVATED BLOOD PRESSURE AFFECTING PREGNANCY IN SECOND TRIMESTER, ANTEPARTUM: ICD-10-CM

## 2024-04-12 LAB
CREAT UR-MCNC: 103 MG/DL (ref 28–217)
TOTAL PROTEIN, URINE: 8 MG/DL
URINE TOTAL PROTEIN CREATININE RATIO: 0.08

## 2024-04-15 ENCOUNTER — HOSPITAL ENCOUNTER (OUTPATIENT)
Age: 29
Discharge: HOME | End: 2024-04-15
Payer: COMMERCIAL

## 2024-04-15 DIAGNOSIS — Z34.90 NORMAL REPEAT PREGNANCY, ANTEPARTUM: ICD-10-CM

## 2024-04-15 DIAGNOSIS — O16.2 ELEVATED BLOOD PRESSURE AFFECTING PREGNANCY IN SECOND TRIMESTER, ANTEPARTUM: ICD-10-CM

## 2024-04-15 DIAGNOSIS — O09.299 HX OF PREECLAMPSIA, PRIOR PREGNANCY, CURRENTLY PREGNANT: ICD-10-CM

## 2024-04-15 DIAGNOSIS — Z3A.17 17 WEEKS GESTATION OF PREGNANCY: ICD-10-CM

## 2024-04-15 LAB
ALBUMIN SERPL-MCNC: 3.9 G/DL (ref 3.5–5.2)
ALBUMIN/GLOB SERPL: 1 {RATIO} (ref 1–2.5)
ALP SERPL-CCNC: 47 U/L (ref 35–104)
ALT SERPL-CCNC: 17 U/L (ref 10–35)
ANION GAP SERPL CALCULATED.3IONS-SCNC: 11 MMOL/L (ref 9–16)
AST SERPL-CCNC: 22 U/L (ref 10–35)
BASOPHILS # BLD: 0.03 K/UL (ref 0–0.2)
BASOPHILS NFR BLD: 0 % (ref 0–2)
BILIRUB SERPL-MCNC: 0.5 MG/DL (ref 0–1.2)
BUN SERPL-MCNC: 8 MG/DL (ref 6–20)
CALCIUM SERPL-MCNC: 9.1 MG/DL (ref 8.6–10.4)
CHLORIDE SERPL-SCNC: 102 MMOL/L (ref 98–107)
CO2 SERPL-SCNC: 23 MMOL/L (ref 20–31)
CREAT SERPL-MCNC: 0.5 MG/DL (ref 0.5–0.9)
EOSINOPHIL # BLD: 0.18 K/UL (ref 0–0.44)
EOSINOPHILS RELATIVE PERCENT: 1 % (ref 1–4)
ERYTHROCYTE [DISTWIDTH] IN BLOOD BY AUTOMATED COUNT: 13.7 % (ref 11.8–14.4)
GFR SERPL CREATININE-BSD FRML MDRD: >90 ML/MIN/1.73M2
GLUCOSE SERPL-MCNC: 88 MG/DL (ref 74–99)
HCT VFR BLD AUTO: 36 % (ref 36.3–47.1)
HGB BLD-MCNC: 12.2 G/DL (ref 11.9–15.1)
IMM GRANULOCYTES # BLD AUTO: 0.08 K/UL (ref 0–0.3)
IMM GRANULOCYTES NFR BLD: 1 %
LYMPHOCYTES NFR BLD: 2.57 K/UL (ref 1.1–3.7)
LYMPHOCYTES RELATIVE PERCENT: 21 % (ref 24–43)
MCH RBC QN AUTO: 30.2 PG (ref 25.2–33.5)
MCHC RBC AUTO-ENTMCNC: 33.9 G/DL (ref 28.4–34.8)
MCV RBC AUTO: 89.1 FL (ref 82.6–102.9)
MONOCYTES NFR BLD: 0.6 K/UL (ref 0.1–1.2)
MONOCYTES NFR BLD: 5 % (ref 3–12)
NEUTROPHILS NFR BLD: 72 % (ref 36–65)
NEUTS SEG NFR BLD: 9.09 K/UL (ref 1.5–8.1)
NRBC BLD-RTO: 0 PER 100 WBC
PLATELET # BLD AUTO: 246 K/UL (ref 138–453)
PMV BLD AUTO: 10.2 FL (ref 8.1–13.5)
POTASSIUM SERPL-SCNC: 3.3 MMOL/L (ref 3.7–5.3)
PROT SERPL-MCNC: 6.7 G/DL (ref 6.6–8.7)
RBC # BLD AUTO: 4.04 M/UL (ref 3.95–5.11)
SODIUM SERPL-SCNC: 136 MMOL/L (ref 136–145)
WBC OTHER # BLD: 12.6 K/UL (ref 3.5–11.3)

## 2024-04-15 PROCEDURE — 80053 COMPREHEN METABOLIC PANEL: CPT

## 2024-04-15 PROCEDURE — 36415 COLL VENOUS BLD VENIPUNCTURE: CPT

## 2024-04-15 PROCEDURE — 82105 ALPHA-FETOPROTEIN SERUM: CPT

## 2024-04-15 PROCEDURE — 85025 COMPLETE CBC W/AUTO DIFF WBC: CPT

## 2024-04-15 RX ORDER — POTASSIUM CHLORIDE 20 MEQ/1
20 TABLET, EXTENDED RELEASE ORAL DAILY
Qty: 3 TABLET | Refills: 0 | Status: SHIPPED | OUTPATIENT
Start: 2024-04-15

## 2024-04-17 LAB
AFP INTERPRETATION: NORMAL
AFP MOM: 2.21
AFP SPECIMEN: NORMAL
AFP: 86 NG/ML
DATE OF BIRTH: NORMAL
DATING METHOD: NORMAL
DETERMINED BY: NORMAL
DIABETIC: NO
DONOR EGG?: NO
DUE DATE: NORMAL
ESTIMATED DUE DATE: NORMAL
FAMILY HISTORY NTD: NO
GESTATIONAL AGE: NORMAL
IN VITRO FERTILIZATION: NO
INSULIN REQ DIABETES: NO
LAST MENSTRUAL PERIOD: NORMAL
MATERNAL AGE AT EDD: 29.9 YR
MATERNAL WEIGHT: 184
MONOCHORIONIC TWINS: NORMAL
NUMBER OF FETUSES: NORMAL
PATIENT WEIGHT UNITS: NORMAL
PATIENT WEIGHT: NORMAL
RACE (MATERNAL): NORMAL
RACE: NORMAL
REPEAT SPECIMEN?: NO
SMOKING: NO
SMOKING: NO
VALPROIC/CARBAMAZEP: NORMAL
ZZ NTE CLEAN UP: HISTORY: NO

## 2024-05-07 ENCOUNTER — ROUTINE PRENATAL (OUTPATIENT)
Dept: PERINATAL CARE | Age: 29
End: 2024-05-07

## 2024-05-07 ENCOUNTER — CARE COORDINATION (OUTPATIENT)
Dept: OTHER | Facility: CLINIC | Age: 29
End: 2024-05-07

## 2024-05-07 VITALS
SYSTOLIC BLOOD PRESSURE: 120 MMHG | DIASTOLIC BLOOD PRESSURE: 80 MMHG | HEART RATE: 104 BPM | RESPIRATION RATE: 16 BRPM | WEIGHT: 185 LBS | BODY MASS INDEX: 29.73 KG/M2 | TEMPERATURE: 99.2 F | HEIGHT: 66 IN

## 2024-05-07 DIAGNOSIS — Z82.49 FAMILY HISTORY OF DVT: ICD-10-CM

## 2024-05-07 DIAGNOSIS — Z3A.21 21 WEEKS GESTATION OF PREGNANCY: Primary | ICD-10-CM

## 2024-05-07 DIAGNOSIS — Z36.86 ENCOUNTER FOR SCREENING FOR RISK OF PRE-TERM LABOR: ICD-10-CM

## 2024-05-07 DIAGNOSIS — O16.1 ELEVATED BLOOD PRESSURE AFFECTING PREGNANCY IN FIRST TRIMESTER, ANTEPARTUM: ICD-10-CM

## 2024-05-07 DIAGNOSIS — O09.292 HISTORY OF PRE-ECLAMPSIA IN PRIOR PREGNANCY, CURRENTLY PREGNANT, SECOND TRIMESTER: ICD-10-CM

## 2024-05-07 DIAGNOSIS — O09.299 HX OF PREECLAMPSIA, PRIOR PREGNANCY, CURRENTLY PREGNANT: ICD-10-CM

## 2024-05-07 DIAGNOSIS — Z3A.21 21 WEEKS GESTATION OF PREGNANCY: ICD-10-CM

## 2024-05-07 DIAGNOSIS — O16.2 HYPERTENSION AFFECTING PREGNANCY IN SECOND TRIMESTER: Primary | ICD-10-CM

## 2024-05-07 DIAGNOSIS — Z82.49 FAMILY HISTORY OF THROMBOEMBOLIC DISEASE: ICD-10-CM

## 2024-05-07 PROBLEM — O14.90 PRE-ECLAMPSIA, ANTEPARTUM: Status: ACTIVE | Noted: 2024-05-07

## 2024-05-07 PROBLEM — O36.80X0 PREGNANCY OF UNKNOWN ANATOMIC LOCATION: Status: RESOLVED | Noted: 2024-01-17 | Resolved: 2024-05-07

## 2024-05-07 NOTE — PROGRESS NOTES
Obstetric/Gynecology Maternal Fetal Medicine Resident Note    Patient is amenable to  formal consult with MFM attending physician for newly diagnosed chronic hypertension, History of PreE (G1), and family history for VTE (patient's mother).     Brenda Swenson DO  OBGYN Resident, PGY1  [facility]  5/7/2024, 9:41 AM

## 2024-05-09 ENCOUNTER — ROUTINE PRENATAL (OUTPATIENT)
Dept: OBGYN CLINIC | Age: 29
End: 2024-05-09

## 2024-05-09 VITALS — BODY MASS INDEX: 30.25 KG/M2 | WEIGHT: 187.4 LBS | SYSTOLIC BLOOD PRESSURE: 128 MMHG | DIASTOLIC BLOOD PRESSURE: 78 MMHG

## 2024-05-09 DIAGNOSIS — O09.92 HIGH-RISK PREGNANCY IN SECOND TRIMESTER: Primary | ICD-10-CM

## 2024-05-09 DIAGNOSIS — O14.90 PRE-ECLAMPSIA, ANTEPARTUM: ICD-10-CM

## 2024-05-09 DIAGNOSIS — Z3A.21 21 WEEKS GESTATION OF PREGNANCY: ICD-10-CM

## 2024-05-09 PROCEDURE — 0502F SUBSEQUENT PRENATAL CARE: CPT | Performed by: NURSE PRACTITIONER

## 2024-05-09 NOTE — PROGRESS NOTES
Presents for OB visit  Gestation 21w3d  Estimated Date of Delivery: 24    Insurance: [facility]    IPV bag/mug given:2/15/2024 Tamara Roman LPN   Genetic Information given/reviewed: 2024 KARINA Diaz CNM   1st trimester education packet given: 2024 KARINA Diaz CNM   2nd trimester education packet given:  3rd trimester education packet given:      FOB Name:    Last Pap Smear:  declining pap until pp   [x] Urine collected for culture 2/15/2024 Tamara Roman LPN    [x] Negative date 2/15/24   [] Positive date **  [x] UDS collected 24  [x] Gc/ct collected 24  [x] Prenatal Labs completed     Genetic Screening:  [x]Accepted NIPS 2024 KARINA Diaz CNM 2024 Tamara Roman LPN   [x] Low risk       Carrier Screening:  [x] Known negative CF ONLY NO SMA/Fragile X    Baby aspirin screen:  [x]Positive (hx preE)  []Negative    Early 1 hour GTT:  []Yes  [x] No    AFP:  []Ordered 2024   [x]Completed NEGATIVE 2024 TAMARA ROMAN LPN     Anatomy scan:  [x]Referral Sent 2/15/2024 Tamara Roman LPN   [x]Scheduled 24  []Completed  [] Follow up **    Fetal Echo:   [] Yes  [] No    High Risk Factors:  Hx miscarriage x2  Hx preE  -baseline labs at [facility]  - baby asa at 13 weeks   - Bp 130/100 2024   []Placed on High Risk List    Fetal Surveillance:  [] Yes  [] No    Covid Vaccine:DECLINED 2/15/2024 Tamara Roman LPN   Flu Vaccine:  [x]Given **  [] Declined **  Tdap:  []Given **  [] Declined **  Rhogam:  []Given **  [] Not indicated     Varicella: non immune     1hr GTT:  [] Results **  3hr GTT:    []Breast Pump Order Signed/Sent    36 weeks US:  []Completed     GBS:  [] Positive   [] Negative from **    Apts with :  [] Date: ** Gestational Age: **  [] Date: ** Gestational Age: **    Apts with :  [] Date: ** Gestational Age: **  [] Date: ** Gestational Age: **    Depression/Anxiety screening (date/results/sign off)  1st

## 2024-06-03 ENCOUNTER — ROUTINE PRENATAL (OUTPATIENT)
Dept: OBGYN CLINIC | Age: 29
End: 2024-06-03

## 2024-06-03 VITALS — DIASTOLIC BLOOD PRESSURE: 80 MMHG | WEIGHT: 191 LBS | BODY MASS INDEX: 30.83 KG/M2 | SYSTOLIC BLOOD PRESSURE: 130 MMHG

## 2024-06-03 DIAGNOSIS — O09.92 HIGH-RISK PREGNANCY IN SECOND TRIMESTER: Primary | ICD-10-CM

## 2024-06-03 DIAGNOSIS — O09.299 HX OF PREECLAMPSIA, PRIOR PREGNANCY, CURRENTLY PREGNANT: ICD-10-CM

## 2024-06-03 DIAGNOSIS — Z3A.25 25 WEEKS GESTATION OF PREGNANCY: ICD-10-CM

## 2024-06-03 PROCEDURE — 0502F SUBSEQUENT PRENATAL CARE: CPT | Performed by: OBSTETRICS & GYNECOLOGY

## 2024-06-04 ENCOUNTER — ROUTINE PRENATAL (OUTPATIENT)
Dept: PERINATAL CARE | Age: 29
End: 2024-06-04
Payer: COMMERCIAL

## 2024-06-04 VITALS
TEMPERATURE: 97.8 F | WEIGHT: 189 LBS | HEIGHT: 66 IN | BODY MASS INDEX: 30.37 KG/M2 | SYSTOLIC BLOOD PRESSURE: 146 MMHG | RESPIRATION RATE: 16 BRPM | HEART RATE: 128 BPM | DIASTOLIC BLOOD PRESSURE: 80 MMHG

## 2024-06-04 DIAGNOSIS — Z82.49 FAMILY HISTORY OF THROMBOEMBOLIC DISEASE: ICD-10-CM

## 2024-06-04 DIAGNOSIS — O09.292 HISTORY OF PRE-ECLAMPSIA IN PRIOR PREGNANCY, CURRENTLY PREGNANT, SECOND TRIMESTER: ICD-10-CM

## 2024-06-04 DIAGNOSIS — Z3A.25 25 WEEKS GESTATION OF PREGNANCY: ICD-10-CM

## 2024-06-04 DIAGNOSIS — Z36.4 ULTRASOUND FOR ANTENATAL SCREENING FOR FETAL GROWTH RESTRICTION: ICD-10-CM

## 2024-06-04 DIAGNOSIS — O16.2 HYPERTENSION AFFECTING PREGNANCY IN SECOND TRIMESTER: Primary | ICD-10-CM

## 2024-06-04 PROCEDURE — 99999 PR OFFICE/OUTPT VISIT,PROCEDURE ONLY: CPT | Performed by: OBSTETRICS & GYNECOLOGY

## 2024-06-04 PROCEDURE — 76820 UMBILICAL ARTERY ECHO: CPT | Performed by: OBSTETRICS & GYNECOLOGY

## 2024-06-04 PROCEDURE — 76816 OB US FOLLOW-UP PER FETUS: CPT | Performed by: OBSTETRICS & GYNECOLOGY

## 2024-06-04 NOTE — PROGRESS NOTES
Obstetric/Gynecology Maternal Fetal Medicine Resident Note    Resident in to evaluate patient for elevated non severe BP and tachycardia. Patient has a diagnosis of cHTN (no meds). Denies signs and symptoms of preeclampsia. Denies shortness of breath, chest pain, headaches, vision changes. Denies calf pain. Patient to follow with primary OB provider.     Discussed with Dr. Clay.    Amanda Flaherty MD  OBGYN Resident, PGY1  [facility]  6/4/2024, 8:06 AM

## 2024-06-06 NOTE — PROGRESS NOTES
Renate Mcbride is a 28 y.o. female 25w2d        OB History    Para Term  AB Living   4 1 1   2 1   SAB IAB Ectopic Molar Multiple Live Births   2       0 1      # Outcome Date GA Lbr Archie/2nd Weight Sex Delivery Anes PTL Lv   4 Current            3 SAB 2023           2 SAB 2023     SAB         Birth Comments: D&C   1 Term 21 39w2d 01:51 / 00:17 3.445 kg (7 lb 9.5 oz) M Vag-Spont EPI N EDENILSON       Vitals  BP: 130/80  Weight - Scale: 86.6 kg (191 lb)  Patient Position: Sitting  Albumin: Negative  Glucose: Negative  Fundal Height (cm): 25 cm  Fetal HR: 159  Movement: Present    The patient was seen and evaluated. There was positive fetal movements. No contractions or leakage of fluid. Signs and symptoms of  labor as well as labor were reviewed.  Counseled on NIPT; completed and low risk.   She is known negative for CF, declined carrier screening.  The patients anatomy ultrasound has been completed and reviewed with patient. TOP ST OH Reviewed. A 28 week lab panel was ordered. This includes a (HH, 1 hr GTT, U/A C&S). The patient is to complete this in the next two to four weeks.    The S/S of Pre-Eclampsia were reviewed with the patient in detail. She is to report any of these if they occur. She currently denies any of these.    The patient is RH negative Rhogam Ordered no to be done 28w    The patient was instructed on fetal kick counts and was given a kick sheet to complete every 8 hours. This is to begin at 28 weeks gestation. She was instructed that the baby should move at a minimum of ten times within one hour after a meal. The patient was instructed to lay down on her left side twenty minutes after eating and count movements for up to one hour with a target value of ten movements.  She was instructed to notify the office if she did not make that target after two attempts or if after any attempt there was less than four movements.      Insurance: [employer]    IPV bag/mug

## 2024-06-11 ENCOUNTER — HOSPITAL ENCOUNTER (OUTPATIENT)
Age: 29
Setting detail: SPECIMEN
Discharge: HOME | End: 2024-06-11

## 2024-06-11 DIAGNOSIS — Z3A.21 21 WEEKS GESTATION OF PREGNANCY: ICD-10-CM

## 2024-06-11 LAB
BACTERIA URNS QL MICRO: ABNORMAL
BILIRUB UR QL STRIP: NEGATIVE
CASTS #/AREA URNS LPF: ABNORMAL /LPF (ref 0–8)
CLARITY UR: ABNORMAL
COLOR UR: YELLOW
EPI CELLS #/AREA URNS HPF: ABNORMAL /HPF (ref 0–5)
GLUCOSE UR STRIP-MCNC: NEGATIVE MG/DL
HGB UR QL STRIP.AUTO: NEGATIVE
KETONES UR STRIP-MCNC: NEGATIVE MG/DL
LEUKOCYTE ESTERASE UR QL STRIP: ABNORMAL
NITRITE UR QL STRIP: NEGATIVE
PH UR STRIP: 7.5 [PH] (ref 5–8)
PROT UR STRIP-MCNC: NEGATIVE MG/DL
RBC #/AREA URNS HPF: ABNORMAL /HPF (ref 0–4)
SP GR UR STRIP: 1.01 (ref 1–1.03)
UROBILINOGEN UR STRIP-ACNC: NORMAL EU/DL (ref 0–1)
WBC #/AREA URNS HPF: ABNORMAL /HPF (ref 0–5)

## 2024-06-12 LAB
MICROORGANISM SPEC CULT: NORMAL
SERVICE CMNT-IMP: NORMAL
SPECIMEN DESCRIPTION: NORMAL

## 2024-06-13 ENCOUNTER — HOSPITAL ENCOUNTER (OUTPATIENT)
Age: 29
Discharge: HOME | End: 2024-06-13
Payer: COMMERCIAL

## 2024-06-13 DIAGNOSIS — Z3A.21 21 WEEKS GESTATION OF PREGNANCY: ICD-10-CM

## 2024-06-13 LAB
BASOPHILS # BLD: 0.04 K/UL (ref 0–0.2)
BASOPHILS NFR BLD: 0 % (ref 0–2)
BLOOD GROUP ANTIBODIES SERPL: NEGATIVE
EOSINOPHIL # BLD: 0.1 K/UL (ref 0–0.44)
EOSINOPHILS RELATIVE PERCENT: 1 % (ref 1–4)
ERYTHROCYTE [DISTWIDTH] IN BLOOD BY AUTOMATED COUNT: 13.2 % (ref 11.8–14.4)
GLUCOSE 1H P 50 G GLC PO SERPL-MCNC: 137 MG/DL (ref 70–135)
GLUCOSE ADMINISTRATION: ABNORMAL
HCT VFR BLD AUTO: 37.4 % (ref 36.3–47.1)
HGB BLD-MCNC: 11.5 G/DL (ref 11.9–15.1)
IMM GRANULOCYTES # BLD AUTO: 0.13 K/UL (ref 0–0.3)
IMM GRANULOCYTES NFR BLD: 1 %
LYMPHOCYTES NFR BLD: 1.92 K/UL (ref 1.1–3.7)
LYMPHOCYTES RELATIVE PERCENT: 16 % (ref 24–43)
MCH RBC QN AUTO: 30.3 PG (ref 25.2–33.5)
MCHC RBC AUTO-ENTMCNC: 30.7 G/DL (ref 28.4–34.8)
MCV RBC AUTO: 98.4 FL (ref 82.6–102.9)
MONOCYTES NFR BLD: 0.47 K/UL (ref 0.1–1.2)
MONOCYTES NFR BLD: 4 % (ref 3–12)
NEUTROPHILS NFR BLD: 78 % (ref 36–65)
NEUTS SEG NFR BLD: 9.12 K/UL (ref 1.5–8.1)
NRBC BLD-RTO: 0 PER 100 WBC
PLATELET # BLD AUTO: 226 K/UL (ref 138–453)
PMV BLD AUTO: 10 FL (ref 8.1–13.5)
RBC # BLD AUTO: 3.8 M/UL (ref 3.95–5.11)
WBC OTHER # BLD: 11.8 K/UL (ref 3.5–11.3)

## 2024-06-13 PROCEDURE — 86850 RBC ANTIBODY SCREEN: CPT

## 2024-06-13 PROCEDURE — 36415 COLL VENOUS BLD VENIPUNCTURE: CPT

## 2024-06-13 PROCEDURE — 82950 GLUCOSE TEST: CPT

## 2024-06-13 PROCEDURE — 85025 COMPLETE CBC W/AUTO DIFF WBC: CPT

## 2024-06-21 DIAGNOSIS — R73.09 ELEVATED GLUCOSE TOLERANCE TEST: Primary | ICD-10-CM

## 2024-06-22 ENCOUNTER — HOSPITAL ENCOUNTER (OUTPATIENT)
Age: 29
Discharge: HOME | End: 2024-06-22
Payer: COMMERCIAL

## 2024-06-22 DIAGNOSIS — R73.09 ELEVATED GLUCOSE TOLERANCE TEST: ICD-10-CM

## 2024-06-22 LAB
AMOUNT GLUCOSE GIVEN: NORMAL G
GLUCOSE 2H P 100 G GLC PO SERPL-MCNC: 85 MG/DL
GLUCOSE 3H P 100 G GLC PO SERPL-MCNC: 176 MG/DL
GLUCOSE TOLERANCE TEST 2 HOUR: 155 MG/DL
GLUCOSE TOLERANCE TEST 3 HOUR: 147 MG/DL

## 2024-06-22 PROCEDURE — 82951 GLUCOSE TOLERANCE TEST (GTT): CPT

## 2024-06-22 PROCEDURE — 36415 COLL VENOUS BLD VENIPUNCTURE: CPT

## 2024-06-22 PROCEDURE — 82952 GTT-ADDED SAMPLES: CPT

## 2024-06-24 ENCOUNTER — ROUTINE PRENATAL (OUTPATIENT)
Dept: OBGYN CLINIC | Age: 29
End: 2024-06-24
Payer: COMMERCIAL

## 2024-06-24 VITALS — WEIGHT: 193 LBS | DIASTOLIC BLOOD PRESSURE: 78 MMHG | SYSTOLIC BLOOD PRESSURE: 122 MMHG | BODY MASS INDEX: 31.15 KG/M2

## 2024-06-24 DIAGNOSIS — O09.93 HIGH-RISK PREGNANCY IN THIRD TRIMESTER: Primary | ICD-10-CM

## 2024-06-24 DIAGNOSIS — Z67.91 BLOOD TYPE, RH NEGATIVE: ICD-10-CM

## 2024-06-24 DIAGNOSIS — Z23 NEED FOR TDAP VACCINATION: ICD-10-CM

## 2024-06-24 DIAGNOSIS — O24.419 GESTATIONAL DIABETES MELLITUS (GDM) IN THIRD TRIMESTER, GESTATIONAL DIABETES METHOD OF CONTROL UNSPECIFIED: ICD-10-CM

## 2024-06-24 DIAGNOSIS — O09.299 HX OF PREECLAMPSIA, PRIOR PREGNANCY, CURRENTLY PREGNANT: ICD-10-CM

## 2024-06-24 DIAGNOSIS — Z3A.28 28 WEEKS GESTATION OF PREGNANCY: ICD-10-CM

## 2024-06-24 PROCEDURE — 90715 TDAP VACCINE 7 YRS/> IM: CPT | Performed by: NURSE PRACTITIONER

## 2024-06-24 PROCEDURE — 90471 IMMUNIZATION ADMIN: CPT | Performed by: NURSE PRACTITIONER

## 2024-06-24 PROCEDURE — 96372 THER/PROPH/DIAG INJ SC/IM: CPT | Performed by: NURSE PRACTITIONER

## 2024-06-24 PROCEDURE — 0502F SUBSEQUENT PRENATAL CARE: CPT | Performed by: NURSE PRACTITIONER

## 2024-06-24 RX ORDER — BLOOD-GLUCOSE METER
KIT MISCELLANEOUS
Qty: 1 KIT | Refills: 0 | Status: SHIPPED | OUTPATIENT
Start: 2024-06-24

## 2024-06-24 RX ORDER — GLUCOSAMINE HCL/CHONDROITIN SU 500-400 MG
CAPSULE ORAL
Qty: 100 STRIP | Refills: 0 | Status: SHIPPED | OUTPATIENT
Start: 2024-06-24

## 2024-06-24 RX ORDER — LANCETS 30 GAUGE
1 EACH MISCELLANEOUS DAILY
Qty: 100 EACH | Refills: 5 | Status: SHIPPED | OUTPATIENT
Start: 2024-06-24

## 2024-06-24 NOTE — PATIENT INSTRUCTIONS
ask your healthcare professional. [employer] disclaims any warranty or liability for your use of this information.          Labor: Care Instructions  Your Care Instructions     labor is the start of labor between 20 and 36 weeks of pregnancy. A full-term pregnancy lasts 37 to 42 weeks. In labor, the uterus contracts to open the cervix. This is the first stage of childbirth.  labor can be caused by a problem with the baby, the mother, or both. Often the cause is not known.  In some cases, doctors use medicines to try to delay labor until 34 or more weeks of pregnancy. By this time, a baby has grown enough so that problems are not likely. In some cases--such as with a serious infection--it is healthier for the baby to be born early. Your treatment will depend on how far along you are in your pregnancy and on your health and your baby's health.  Follow-up care is a key part of your treatment and safety. Be sure to make and go to all appointments, and call your doctor if you are having problems. It's also a good idea to know your test results and keep a list of the medicines you take.  How can you care for yourself at home?  If your doctor prescribed medicines, take them exactly as directed. Call your doctor if you think you are having a problem with your medicine.  Rest until your doctor advises you about activity. He or she will tell you if you should stay in bed most of the time. You may need to arrange for  if you have young children.  Do not have sexual intercourse unless your doctor says it is safe.  Use pads, not tampons, if you have vaginal bleeding.  Make sure to drink plenty of fluids. Dehydration can lead to contractions. If you have kidney, heart, or liver disease and have to limit fluids, talk with your doctor before you increase the amount of fluids you drink.  Do not smoke or allow others to smoke around you. If you need help quitting, talk to your doctor about

## 2024-06-24 NOTE — PROGRESS NOTES
Presents for OB visit  Gestation 28w0d  Estimated Date of Delivery: 24    Insurance: [employer]/[facility]    IPV bag/mug given:2024 Ty Argueta DO   Genetic Information given/reviewed: 2024 Ty Argueta DO   1st trimester education packet given: 2024 Ty Argueta DO   2nd trimester education packet given:  3rd trimester education packet given:      FOB Name:    Last Pap Smear:  declining pap until pp   [x] Urine collected for culture 2024 Ty Argueta DO    [x] Negative date 2/15/24   [] Positive date **  [x] UDS collected 24  [x] Gc/ct collected 24  [x] Prenatal Labs completed     Genetic Screening:  [x]Accepted NIPS 2024 Ty Argueta DO 2024 Ty Argueta DO   [x] Low risk       Carrier Screening:  [x] Known negative CF ONLY NO SMA/Fragile X    Baby aspirin screen:  [x]Positive (hx preE)  []Negative    Early 1 hour GTT:  []Yes  [x] No    AFP:  []Ordered 2024   [x]Completed NEGATIVE 2024 Ty Argueta DO     Anatomy scan:  [x]Referral Sent 2024 Ty Argueta DO   [x]Scheduled 24  [x]Completed  [] Follow up **    Fetal Echo:   [] Yes  [] No    High Risk Factors:  Hx miscarriage x2  Hx preE  -baseline labs at [facility]  - baby asa at 13 weeks   - Bp 130/100 2024   MFM diagnosed with cHTN (no meds) at Anatomy US on 24 has MFM consult on 24.  cHTN (no meds)  FmHx VTE (mother)  Gestational diabetes  - Dx on 2024 from  values failed, diabetic supplies sent 2024 and discussed sending weekly logs   - Referral MFM and Dietician on 2024     [x]Placed on High Risk List 2024 Ty Argueta DO     Fetal Surveillance:  [x] Yes 32wk chtn no meds (dx by MFM)  [] No    Covid Vaccine:DECLINED 2024 Ty Argueta,    Flu Vaccine:  [x]Given **  [] Declined **  Tdap:  [x]Given 24  [] Declined **  Rhogam: needed at 28 weeks, A negative  [x]Given 24  [] Not indicated     Varicella: non immune     1hr GTT:  [x] Results Failed- 3 hour GTT

## 2024-07-02 ENCOUNTER — ROUTINE PRENATAL (OUTPATIENT)
Dept: PERINATAL CARE | Age: 29
End: 2024-07-02
Payer: COMMERCIAL

## 2024-07-02 VITALS
HEART RATE: 114 BPM | RESPIRATION RATE: 16 BRPM | HEIGHT: 66 IN | DIASTOLIC BLOOD PRESSURE: 84 MMHG | WEIGHT: 195.77 LBS | SYSTOLIC BLOOD PRESSURE: 133 MMHG | TEMPERATURE: 98.6 F | BODY MASS INDEX: 31.46 KG/M2

## 2024-07-02 DIAGNOSIS — Z36.3 ENCOUNTER FOR ROUTINE SCREENING FOR MALFORMATION USING ULTRASONICS: ICD-10-CM

## 2024-07-02 DIAGNOSIS — Z82.49 FAMILY HISTORY OF THROMBOEMBOLIC DISEASE: ICD-10-CM

## 2024-07-02 DIAGNOSIS — O09.293 HISTORY OF PRE-ECLAMPSIA IN PRIOR PREGNANCY, CURRENTLY PREGNANT, THIRD TRIMESTER: ICD-10-CM

## 2024-07-02 DIAGNOSIS — Z3A.29 29 WEEKS GESTATION OF PREGNANCY: ICD-10-CM

## 2024-07-02 DIAGNOSIS — O24.410 DIET CONTROLLED GESTATIONAL DIABETES MELLITUS (GDM), ANTEPARTUM: Primary | ICD-10-CM

## 2024-07-02 DIAGNOSIS — R00.0 TACHYCARDIA: Primary | ICD-10-CM

## 2024-07-02 DIAGNOSIS — O16.3 HYPERTENSION AFFECTING PREGNANCY IN THIRD TRIMESTER: ICD-10-CM

## 2024-07-02 DIAGNOSIS — O99.413 MATERNAL CARDIOVASCULAR DISEASE AFFECTING PREGNANCY IN THIRD TRIMESTER: ICD-10-CM

## 2024-07-02 DIAGNOSIS — O36.60X0 EXCESSIVE FETAL GROWTH AFFECTING PREGNANCY, ANTEPARTUM, SINGLE OR UNSPECIFIED FETUS: ICD-10-CM

## 2024-07-02 PROCEDURE — 76805 OB US >/= 14 WKS SNGL FETUS: CPT | Performed by: OBSTETRICS & GYNECOLOGY

## 2024-07-02 PROCEDURE — 76819 FETAL BIOPHYS PROFIL W/O NST: CPT | Performed by: OBSTETRICS & GYNECOLOGY

## 2024-07-02 PROCEDURE — 76820 UMBILICAL ARTERY ECHO: CPT | Performed by: OBSTETRICS & GYNECOLOGY

## 2024-07-02 PROCEDURE — 99204 OFFICE O/P NEW MOD 45 MIN: CPT | Performed by: OBSTETRICS & GYNECOLOGY

## 2024-07-05 ENCOUNTER — PATIENT MESSAGE (OUTPATIENT)
Dept: OBGYN CLINIC | Age: 29
End: 2024-07-05

## 2024-07-05 NOTE — TELEPHONE ENCOUNTER
From: Renate Mcbride  To: Dr. Ty Argueta  Sent: 7/5/2024 6:25 AM EDT  Subject: Glucose Log 6/28/24- 7/3/24    Hey Dr. Argueta- attached is my glucose log for the week. Thanks and hope you all had a great holiday!

## 2024-07-05 NOTE — TELEPHONE ENCOUNTER
Glucose logs look good! Continue checking blood sugars and sending in values. We will likely need to start a nighttime insulin in the future to target the fasting levels but not at this time.     Dr. Francisco

## 2024-07-15 DIAGNOSIS — O24.419 GESTATIONAL DIABETES MELLITUS (GDM) IN THIRD TRIMESTER, GESTATIONAL DIABETES METHOD OF CONTROL UNSPECIFIED: ICD-10-CM

## 2024-07-15 DIAGNOSIS — Z3A.28 28 WEEKS GESTATION OF PREGNANCY: ICD-10-CM

## 2024-07-15 RX ORDER — BLOOD-GLUCOSE METER
KIT MISCELLANEOUS
Qty: 100 STRIP | Refills: 5 | Status: SHIPPED | OUTPATIENT
Start: 2024-07-15

## 2024-07-16 ENCOUNTER — PATIENT MESSAGE (OUTPATIENT)
Dept: OBGYN CLINIC | Age: 29
End: 2024-07-16

## 2024-07-16 NOTE — TELEPHONE ENCOUNTER
"Been to 2 different hospitals and is tired of it. [facility] and [facility].     [facility] refused to let her in, patient called and filed a complaint on them    Then Yesterday in the morning decided to go back to [facility] due to being stressed out and Sick and in pain.     Patient states she had Good sleep last night for 13 hours.     States that she is having Right kidney pain since Sunday  Thinks may be a stone   Rates pain 8/10  History of kidney stones, feels the same     \"[facility] refused to let me have a MRI\".    States she is still stressed out because it is still bothering her and that she doesn't know what to do.     No fever  No nausea or vomiting, just hungry   Pain is intermittent  No blood in urine  No burning or pain with urination  No abdominal pain  No lightheaded or dizzy  No leg pain or weakness    Triaged to a disposition of Go to ED Now. Patient is very upset by her visit with [facility] and states again that they should have done that. I told patient that if she were unhappy with her care that she should go to a different ED and listed a few off. Patient states that she was also unhappy with [facility]. Advised patient that she could choose another ED or if she felt comfortable waiting she is welcome to make an appointment with her primary care Doctor. Patient signed to  that she can wait and disconnected call.     Reason for Disposition    [1] SEVERE pain (e.g., excruciating, scale 8-10) AND [2] present > 1 hour    Protocols used: FLANK PAIN-A-    Heaven Luong RN Triage Nurse Advisor    " From: Renate Mcbride  To: Dr. Ty Argueta  Sent: 7/16/2024 6:38 AM EDT  Subject: Weekly Glucose Log    Good morning,     I have attached my glucose log for the week. Thanks!     Renate Mcbride

## 2024-07-23 ENCOUNTER — ROUTINE PRENATAL (OUTPATIENT)
Dept: OBGYN CLINIC | Age: 29
End: 2024-07-23
Payer: COMMERCIAL

## 2024-07-23 VITALS — SYSTOLIC BLOOD PRESSURE: 146 MMHG | WEIGHT: 195 LBS | BODY MASS INDEX: 31.47 KG/M2 | DIASTOLIC BLOOD PRESSURE: 82 MMHG

## 2024-07-23 DIAGNOSIS — Z67.91 BLOOD TYPE, RH NEGATIVE: ICD-10-CM

## 2024-07-23 DIAGNOSIS — Z3A.32 32 WEEKS GESTATION OF PREGNANCY: ICD-10-CM

## 2024-07-23 DIAGNOSIS — O09.93 HIGH-RISK PREGNANCY IN THIRD TRIMESTER: Primary | ICD-10-CM

## 2024-07-23 DIAGNOSIS — O24.419 GESTATIONAL DIABETES MELLITUS (GDM) IN THIRD TRIMESTER, GESTATIONAL DIABETES METHOD OF CONTROL UNSPECIFIED: ICD-10-CM

## 2024-07-23 DIAGNOSIS — O09.299 HX OF PREECLAMPSIA, PRIOR PREGNANCY, CURRENTLY PREGNANT: ICD-10-CM

## 2024-07-23 PROBLEM — O02.1 MISSED ABORTION: Status: RESOLVED | Noted: 2023-06-09 | Resolved: 2024-07-23

## 2024-07-23 PROBLEM — N97.0 ANOVULATORY BLEEDING: Status: RESOLVED | Noted: 2020-12-04 | Resolved: 2024-07-23

## 2024-07-23 PROBLEM — N93.9 ABNORMAL UTERINE BLEEDING (AUB): Status: RESOLVED | Noted: 2020-12-04 | Resolved: 2024-07-23

## 2024-07-23 PROCEDURE — 0502F SUBSEQUENT PRENATAL CARE: CPT | Performed by: STUDENT IN AN ORGANIZED HEALTH CARE EDUCATION/TRAINING PROGRAM

## 2024-07-23 PROCEDURE — 59025 FETAL NON-STRESS TEST: CPT | Performed by: STUDENT IN AN ORGANIZED HEALTH CARE EDUCATION/TRAINING PROGRAM

## 2024-07-23 NOTE — PROGRESS NOTES
Prenatal Visit    Renate Mcbride is a 28 y.o. female  at 32w1d    The patient was seen and evaluated. She has no complaints. There was positive fetal movements. She denies contractions, vaginal bleeding and leakage of fluid. Signs and symptoms of  labor as well as labor were reviewed. The S/S of Pre-Eclampsia were reviewed with the patient in detail. She is to report any of these if they occur. She currently denies any of these.    Discussed resident involvement in triage and labor and delivery process. Discussed call group. All questions answered. Patient verbalized understanding and agreement.     Vitals:  BP (!) 146/82   Wt 88.5 kg (195 lb)   LMP 2023 (Exact Date)   BMI 31.47 kg/m²   /84    NST:  Baseline: 150  Variability: moderate  Accelerations: present  Decelerations: absent  Reactive: yes   Time: 20 min      Assessment & Plan:  Renate Mcbride is a 28 y.o. female  at 32w1d   - 28 week labs completed   - Influenza and Covid vaccinations recommended per ACOG: R/B/A discussed with increased risk of both maternal and fetal morbidity and mortality in unvaccinated pregnant patients.    - TDAP and RSV vaccinations recommended per ACOG. R/B/A discussed. She understands must received RSV vaccine at pharmacy.   - Continue prenatal vitamin  Discussed elevated BP today in the setting of chronic hypertension. Patient reports is anxious when at the offices. Was also elevated on  then went back to normotensive. Is normotensive in the 120s and 130s at home. Patient is a PT at [employer]. She is able to monitor her blood pressure at home. Has had mild tachycardia at times but has remained asymptomatic. Discussed possible need for BP meds in the future and possible labs/meds for tachycardia if she becomes symptomatic or HR becomes too elevated. Patient agreeable.       Last Pap Smear:  declining pap until pp   [x] Urine collected for culture 2024 Ty Argueta, DO    [x] Negative date

## 2024-07-23 NOTE — PROGRESS NOTES
Gestational age 32W1D  Indications CHTN, FM HX VTE, GESTATIONAL DIABETES  NST started @  BP @  Pulse @  Weight @  Patient recording movements. Patient stated fetal movement @

## 2024-07-30 ENCOUNTER — ROUTINE PRENATAL (OUTPATIENT)
Dept: PERINATAL CARE | Age: 29
End: 2024-07-30
Payer: COMMERCIAL

## 2024-07-30 VITALS
TEMPERATURE: 98.6 F | DIASTOLIC BLOOD PRESSURE: 82 MMHG | SYSTOLIC BLOOD PRESSURE: 138 MMHG | BODY MASS INDEX: 32.3 KG/M2 | HEART RATE: 124 BPM | HEIGHT: 66 IN | RESPIRATION RATE: 16 BRPM | WEIGHT: 201 LBS

## 2024-07-30 DIAGNOSIS — O09.293 HISTORY OF PRE-ECLAMPSIA IN PRIOR PREGNANCY, CURRENTLY PREGNANT, THIRD TRIMESTER: ICD-10-CM

## 2024-07-30 DIAGNOSIS — Z36.3 ENCOUNTER FOR ROUTINE SCREENING FOR MALFORMATION USING ULTRASONICS: ICD-10-CM

## 2024-07-30 DIAGNOSIS — Z3A.33 33 WEEKS GESTATION OF PREGNANCY: ICD-10-CM

## 2024-07-30 DIAGNOSIS — O99.413 MATERNAL CARDIOVASCULAR DISEASE AFFECTING PREGNANCY IN THIRD TRIMESTER: ICD-10-CM

## 2024-07-30 DIAGNOSIS — O36.60X0 EXCESSIVE FETAL GROWTH AFFECTING PREGNANCY, ANTEPARTUM, SINGLE OR UNSPECIFIED FETUS: ICD-10-CM

## 2024-07-30 DIAGNOSIS — O24.410 DIET CONTROLLED GESTATIONAL DIABETES MELLITUS (GDM), ANTEPARTUM: ICD-10-CM

## 2024-07-30 DIAGNOSIS — O16.3 HYPERTENSION AFFECTING PREGNANCY IN THIRD TRIMESTER: Primary | ICD-10-CM

## 2024-07-30 PROCEDURE — 76816 OB US FOLLOW-UP PER FETUS: CPT | Performed by: OBSTETRICS & GYNECOLOGY

## 2024-07-30 PROCEDURE — 76819 FETAL BIOPHYS PROFIL W/O NST: CPT | Performed by: OBSTETRICS & GYNECOLOGY

## 2024-07-30 PROCEDURE — 76820 UMBILICAL ARTERY ECHO: CPT | Performed by: OBSTETRICS & GYNECOLOGY

## 2024-07-30 PROCEDURE — 99999 PR OFFICE/OUTPT VISIT,PROCEDURE ONLY: CPT | Performed by: OBSTETRICS & GYNECOLOGY

## 2024-07-30 NOTE — PROGRESS NOTES
Glycemic control not assessed because patient failed to bring documentation of blood glucose monitoring.     Please start to send blood glucose monitoring information to [facility] office so that insulin and/or dietary changes can be made if necessary weekly.  Diabetic education/nutrition counseling advised.   Start recommended ADA diet therapy for diabetes.   Compliance with regular prenatal care and blood sugar monitoring strongly encouraged.      Strongly advised patient to be compliant with blood sugar monitoring as previously advised to minimize the potential of adverse  outcomes (e.g. fetal demise/stillbirth, polyhydramnios/oligohydramnios, fetal growth abnormalities, pregnancy loss, hypertensive disorders of pregnancy, indicated  delivery, etc.), potential maternal morbidities, etc.

## 2024-07-30 NOTE — PROGRESS NOTES
Obstetric/Gynecology Maternal Fetal Medicine Resident Note    Resident saw and evaluated patient for tachycardia of 124 and 111bpm on arrival to her appointment. Patient has a known history of tachycardia and has spoken to her primary OBGYN previously. Patient is asymptomatic and denies chest pain/SOB, lightheaded/dizziness, headaches or blurry vision. Patient has a NOÉ appointment tomorrow and is stable for outpatient follow up.    Dr. Cedeño updated.    Brenda Swenson DO  OBGYN Resident, PGY2  [facility]  7/30/2024, 8:05 AM

## 2024-07-30 NOTE — PROGRESS NOTES
Please refer to attached ultrasound report for doctor's evaluation of the clinical information obtained by vital signs, ultrasound, and/or non-stress test along with management recommendation.    Pt did not give a urine specimen at the time of vitals.

## 2024-07-31 ENCOUNTER — ROUTINE PRENATAL (OUTPATIENT)
Dept: OBGYN CLINIC | Age: 29
End: 2024-07-31
Payer: COMMERCIAL

## 2024-07-31 VITALS — DIASTOLIC BLOOD PRESSURE: 80 MMHG | SYSTOLIC BLOOD PRESSURE: 138 MMHG | WEIGHT: 199 LBS | BODY MASS INDEX: 32.12 KG/M2

## 2024-07-31 DIAGNOSIS — O09.93 HIGH-RISK PREGNANCY IN THIRD TRIMESTER: Primary | ICD-10-CM

## 2024-07-31 DIAGNOSIS — O16.3 HYPERTENSION AFFECTING PREGNANCY IN THIRD TRIMESTER: ICD-10-CM

## 2024-07-31 DIAGNOSIS — O24.419 GESTATIONAL DIABETES MELLITUS (GDM) IN THIRD TRIMESTER, GESTATIONAL DIABETES METHOD OF CONTROL UNSPECIFIED: ICD-10-CM

## 2024-07-31 DIAGNOSIS — Z3A.33 33 WEEKS GESTATION OF PREGNANCY: ICD-10-CM

## 2024-07-31 DIAGNOSIS — O09.299 HX OF PREECLAMPSIA, PRIOR PREGNANCY, CURRENTLY PREGNANT: ICD-10-CM

## 2024-07-31 DIAGNOSIS — O36.60X0 EXCESSIVE FETAL GROWTH AFFECTING PREGNANCY, ANTEPARTUM, SINGLE OR UNSPECIFIED FETUS: ICD-10-CM

## 2024-07-31 DIAGNOSIS — Z67.91 BLOOD TYPE, RH NEGATIVE: ICD-10-CM

## 2024-07-31 PROCEDURE — 0502F SUBSEQUENT PRENATAL CARE: CPT | Performed by: NURSE PRACTITIONER

## 2024-07-31 PROCEDURE — 59025 FETAL NON-STRESS TEST: CPT | Performed by: NURSE PRACTITIONER

## 2024-07-31 NOTE — PROGRESS NOTES
Gestational age 33w2d  Indications cHTN, FM HX VTE, GESTATIONAL DIABETES   NST started @ 9:19 am  /80  Weight 199lb  Patient recording movements. Patient stated fetal movement @ 9:15 am.  
138/80  Reviewed baseline labs including cbc, cmp, and protein creatine ratio. Completed: yes. Recommend close blood pressure management and follow up. Patient is able to take blood pressure at home and will check. If persistently 150/90's discussed we could consider medication management at that time. Reviewed in depth signs and symptoms of preeclampsia-  Severe or persistent  headaches, vision changes, cp, sob, midepigastric pain or swelling, notify us or any BP above 160/110  Reviewed baby aspirin after 12 weeks. Patient is  taking  Discussed fetal surveillance at 32 weeks. Patient is agreeable.  EKG is not indicated (greater than 30 years old or cHTN >10 years)  Reviewed ACOG recommendations for well controlled chronic hypertension without medication delivery window between 38w0d to 39w6d.  Reviewed ACOG recommendations for well controlled chronic hypertensions with medication delivery window between 37w0d to 39w6d   7. Excessive fetal growth affecting pregnancy, antepartum, single or unspecified fetus  AC>90%tile on 7/30/24      Reactive NST    Keep scheduled fetal surveillance appointment  Continue Fetal Kick Counts    Of the 30 minute duration appointment visit, April Valentine CNP spent at least 50% of the face-to-face time in counseling, explanation of diagnosis, planning of further management, and answering all questions.

## 2024-07-31 NOTE — PATIENT INSTRUCTIONS
After Hours Number: You can call the office [phone]  or [phone]  Call if you have:  1.  Bleeding like a period  2.  Cramps or contractions greater than 2 hours  3.  If you are leaking fluid  4.  If you've a fever greater than 100°  5.  If you feel as if baby is not moving  6. If you have continuous vomiting over 3-4 hours   Counting Your Baby's Kicks: Care Instructions  Your Care Instructions    Counting your baby's kicks is one way your doctor can tell that your baby is healthy. Most women--especially in a first pregnancy--feel their baby move for the first time between 16 and 22 weeks. The movement may feel like flutters rather than kicks. Your baby may move more at certain times of the day. When you are active, you may notice less kicking than when you are resting. At your prenatal visits, your doctor will ask whether the baby is active.  In your last trimester, your doctor may ask you to count the number of times you feel your baby move.  Follow-up care is a key part of your treatment and safety. Be sure to make and go to all appointments, and call your doctor if you are having problems. It's also a good idea to know your test results and keep a list of the medicines you take.  How do you count fetal kicks?  A common method of checking your baby's movement is to count the number of kicks or moves you feel in 1 hour. Ten movements (such as kicks, flutters, or rolls) in 1 hour are normal. Some doctors suggest that you count in the morning until you get to 10 movements. Then you can quit for that day and start again the next day.  Pick your baby's most active time of day to count. This may be any time from morning to evening.  If you do not feel 10 movements in an hour, your baby may be sleeping. Wait for the next hour and count again.  When should you call for help?  Call your doctor now or seek immediate medical care if:    You noticed that your baby has stopped moving or is moving much less than

## 2024-08-05 ENCOUNTER — ROUTINE PRENATAL (OUTPATIENT)
Dept: OBGYN CLINIC | Age: 29
End: 2024-08-05
Payer: COMMERCIAL

## 2024-08-05 ENCOUNTER — HOSPITAL ENCOUNTER (OUTPATIENT)
Age: 29
Discharge: HOME | End: 2024-08-05
Attending: OBSTETRICS & GYNECOLOGY | Admitting: OBSTETRICS & GYNECOLOGY
Payer: COMMERCIAL

## 2024-08-05 VITALS — BODY MASS INDEX: 31.96 KG/M2 | SYSTOLIC BLOOD PRESSURE: 158 MMHG | WEIGHT: 198 LBS | DIASTOLIC BLOOD PRESSURE: 70 MMHG

## 2024-08-05 VITALS
OXYGEN SATURATION: 98 % | RESPIRATION RATE: 16 BRPM | DIASTOLIC BLOOD PRESSURE: 91 MMHG | HEART RATE: 100 BPM | TEMPERATURE: 98.2 F | SYSTOLIC BLOOD PRESSURE: 135 MMHG

## 2024-08-05 DIAGNOSIS — Z3A.34 34 WEEKS GESTATION OF PREGNANCY: ICD-10-CM

## 2024-08-05 DIAGNOSIS — O26.893 HEADACHE IN PREGNANCY, ANTEPARTUM, THIRD TRIMESTER: ICD-10-CM

## 2024-08-05 DIAGNOSIS — Z67.91 BLOOD TYPE, RH NEGATIVE: ICD-10-CM

## 2024-08-05 DIAGNOSIS — O09.93 HIGH-RISK PREGNANCY IN THIRD TRIMESTER: Primary | ICD-10-CM

## 2024-08-05 DIAGNOSIS — O24.419 GESTATIONAL DIABETES MELLITUS (GDM) IN THIRD TRIMESTER, GESTATIONAL DIABETES METHOD OF CONTROL UNSPECIFIED: ICD-10-CM

## 2024-08-05 DIAGNOSIS — O09.299 HX OF PREECLAMPSIA, PRIOR PREGNANCY, CURRENTLY PREGNANT: ICD-10-CM

## 2024-08-05 DIAGNOSIS — O16.3 HYPERTENSION AFFECTING PREGNANCY IN THIRD TRIMESTER: ICD-10-CM

## 2024-08-05 DIAGNOSIS — R51.9 HEADACHE IN PREGNANCY, ANTEPARTUM, THIRD TRIMESTER: ICD-10-CM

## 2024-08-05 DIAGNOSIS — O16.3 ELEVATED BLOOD PRESSURE AFFECTING PREGNANCY IN THIRD TRIMESTER, ANTEPARTUM: ICD-10-CM

## 2024-08-05 DIAGNOSIS — O36.60X0 EXCESSIVE FETAL GROWTH AFFECTING PREGNANCY, ANTEPARTUM, SINGLE OR UNSPECIFIED FETUS: ICD-10-CM

## 2024-08-05 LAB
ALBUMIN SERPL-MCNC: 3.6 G/DL (ref 3.5–5.2)
ALBUMIN/GLOB SERPL: 1 {RATIO} (ref 1–2.5)
ALP SERPL-CCNC: 127 U/L (ref 35–104)
ALT SERPL-CCNC: 30 U/L (ref 10–35)
ANION GAP SERPL CALCULATED.3IONS-SCNC: 13 MMOL/L (ref 9–16)
AST SERPL-CCNC: 34 U/L (ref 10–35)
BASOPHILS # BLD: 0.04 K/UL (ref 0–0.2)
BASOPHILS NFR BLD: 0 % (ref 0–2)
BILIRUB SERPL-MCNC: 0.6 MG/DL (ref 0–1.2)
BUN SERPL-MCNC: 8 MG/DL (ref 6–20)
CALCIUM SERPL-MCNC: 9.2 MG/DL (ref 8.6–10.4)
CHLORIDE SERPL-SCNC: 106 MMOL/L (ref 98–107)
CO2 SERPL-SCNC: 19 MMOL/L (ref 20–31)
CREAT SERPL-MCNC: 0.4 MG/DL (ref 0.5–0.9)
CREAT UR-MCNC: 11.7 MG/DL (ref 28–217)
EOSINOPHIL # BLD: 0.05 K/UL (ref 0–0.44)
EOSINOPHILS RELATIVE PERCENT: 1 % (ref 1–4)
ERYTHROCYTE [DISTWIDTH] IN BLOOD BY AUTOMATED COUNT: 13.9 % (ref 11.8–14.4)
GFR, ESTIMATED: >90 ML/MIN/1.73M2
GLUCOSE SERPL-MCNC: 77 MG/DL (ref 74–99)
HCT VFR BLD AUTO: 35.8 % (ref 36.3–47.1)
HGB BLD-MCNC: 12 G/DL (ref 11.9–15.1)
IMM GRANULOCYTES # BLD AUTO: 0.1 K/UL (ref 0–0.3)
IMM GRANULOCYTES NFR BLD: 1 %
LYMPHOCYTES NFR BLD: 1.95 K/UL (ref 1.1–3.7)
LYMPHOCYTES RELATIVE PERCENT: 18 % (ref 24–43)
MCH RBC QN AUTO: 29.4 PG (ref 25.2–33.5)
MCHC RBC AUTO-ENTMCNC: 33.5 G/DL (ref 28.4–34.8)
MCV RBC AUTO: 87.7 FL (ref 82.6–102.9)
MONOCYTES NFR BLD: 0.63 K/UL (ref 0.1–1.2)
MONOCYTES NFR BLD: 6 % (ref 3–12)
NEUTROPHILS NFR BLD: 74 % (ref 36–65)
NEUTS SEG NFR BLD: 8.17 K/UL (ref 1.5–8.1)
NRBC BLD-RTO: 0 PER 100 WBC
PLATELET # BLD AUTO: 236 K/UL (ref 138–453)
PMV BLD AUTO: 11.6 FL (ref 8.1–13.5)
POTASSIUM SERPL-SCNC: 3.7 MMOL/L (ref 3.7–5.3)
PROT SERPL-MCNC: 6.2 G/DL (ref 6.6–8.7)
RBC # BLD AUTO: 4.08 M/UL (ref 3.95–5.11)
SODIUM SERPL-SCNC: 138 MMOL/L (ref 136–145)
TOTAL PROTEIN, URINE: 7 MG/DL
URINE TOTAL PROTEIN CREATININE RATIO: 0.61
WBC OTHER # BLD: 10.9 K/UL (ref 3.5–11.3)

## 2024-08-05 PROCEDURE — 59025 FETAL NON-STRESS TEST: CPT | Performed by: NURSE PRACTITIONER

## 2024-08-05 PROCEDURE — 82570 ASSAY OF URINE CREATININE: CPT

## 2024-08-05 PROCEDURE — 85025 COMPLETE CBC W/AUTO DIFF WBC: CPT

## 2024-08-05 PROCEDURE — 80053 COMPREHEN METABOLIC PANEL: CPT

## 2024-08-05 PROCEDURE — 6370000000 HC RX 637 (ALT 250 FOR IP)

## 2024-08-05 PROCEDURE — 84156 ASSAY OF PROTEIN URINE: CPT

## 2024-08-05 PROCEDURE — 0502F SUBSEQUENT PRENATAL CARE: CPT | Performed by: NURSE PRACTITIONER

## 2024-08-05 RX ORDER — LABETALOL 200 MG/1
200 TABLET, FILM COATED ORAL EVERY 12 HOURS SCHEDULED
Qty: 60 TABLET | Refills: 3 | Status: SHIPPED | OUTPATIENT
Start: 2024-08-06

## 2024-08-05 RX ORDER — LABETALOL 200 MG/1
200 TABLET, FILM COATED ORAL EVERY 12 HOURS SCHEDULED
Status: DISCONTINUED | OUTPATIENT
Start: 2024-08-05 | End: 2024-08-05 | Stop reason: HOSPADM

## 2024-08-05 RX ORDER — ACETAMINOPHEN 500 MG
1000 TABLET ORAL EVERY 6 HOURS PRN
Status: DISCONTINUED | OUTPATIENT
Start: 2024-08-05 | End: 2024-08-05 | Stop reason: HOSPADM

## 2024-08-05 RX ADMIN — LABETALOL HYDROCHLORIDE 200 MG: 200 TABLET, FILM COATED ORAL at 12:48

## 2024-08-05 RX ADMIN — ACETAMINOPHEN 1000 MG: 500 TABLET ORAL at 12:28

## 2024-08-05 ASSESSMENT — PAIN SCALES - GENERAL: PAINLEVEL_OUTOF10: 2

## 2024-08-05 NOTE — PATIENT INSTRUCTIONS
stop-smoking programs and medicines. These can increase your chances of quitting for good.  When should you call for help?  Call 911 anytime you think you may need emergency care. For example, call if:    You passed out (lost consciousness).     You have severe vaginal bleeding.     You have severe pain in your belly or pelvis.     You have had fluid gushing or leaking from your vagina and you know or think the umbilical cord is bulging into your vagina. If this happens, immediately get down on your knees so your rear end (buttocks) is higher than your head. This will decrease the pressure on the cord until help arrives.    Call your doctor now or seek immediate medical care if:    You have signs of preeclampsia, such as:  Sudden swelling of your face, hands, or feet.  New vision problems (such as dimness or blurring).  A severe headache.     You have any vaginal bleeding.     You have belly pain or cramping.     You have a fever.     You have had regular contractions (with or without pain) for an hour. This means that you have 6 or more within 1 hour after you change your position and drink fluids.     You have a sudden release of fluid from the vagina.     You have low back pain or pelvic pressure that does not go away.     You notice that your baby has stopped moving or is moving much less than normal.    Watch closely for changes in your health, and be sure to contact your doctor if you have any problems.  Where can you learn more?  Go to [email or web address] and sign in to your [employer] account. Enter Q400 in the Search Health Information box to learn more about \" Labor: Care Instructions.\"     If you do not have an account, please click on the \"Sign Up Now\" link.  Current as of: 2018  Content Version: 12.0  © 1776-2232 Healthwise, [employer]. Care instructions adapted under license by [employer]. If you have questions about a medical condition or this instruction,

## 2024-08-05 NOTE — PROGRESS NOTES
S:  Here for NST for fetal surveillance secondary to high risk pregnancy- GDM, cHTN  Insurance: R/[facility]    IPV bag/mug given:2024 Ty Argueta DO   Genetic Information given/reviewed: 2024 Ty Argueta DO   1st trimester education packet given: 2024 Ty Argueta DO   2nd trimester education packet given:  3rd trimester education packet given:      FOB Name:    Last Pap Smear:  declining pap until pp   [x] Urine collected for culture 2024 Ty Argueta DO    [x] Negative date 2/15/24   [] Positive date   [x] UDS collected 24 > negative  [x] Gc/ct collected 24 > negative  [x] Prenatal Labs completed     Genetic Screening:  [x]Accepted NIPS 2024 Ty Argueta DO 2024 Ty Argueta DO   [x] Low risk     Carrier Screening:  [x] Known negative CF ONLY NO SMA/Fragile X    Baby aspirin screen:  [x]Positive (hx preE)  []Negative    Early 1 hour GTT:  []Yes  [x] No    AFP:  []Ordered 2024   [x]Completed NEGATIVE 2024 Ty Argueta DO     Anatomy scan:  [x]Referral Sent 2024 Ty Argueta DO   [x]Scheduled 24  [x]Completed 24: Anterior placenta with normal cord insertion, 3VC, Male, Normal Anatomy. CL 3.31cm. F/up 4 weeks  [x] Follow up MFM 24: EFW 2#1 67%tile. Following with MFM for growths for cHTN    Fetal Echo:   [] Yes  [] No    High Risk Factors:  Hx preE  -baseline labs at IPV  - baby asa at 13 weeks   - /100 2024   - MFM diagnosed with cHTN (no meds) at [facility]atomy [facility] on 24 has MFM consult on 24.  cHTN (no meds)  - following growth with MFM  - Baseline preE labs wnl P:C 0.09  Gestational diabetes  - Dx on 2024 from  values failed, diabetic supplies sent 2024 and discussed sending weekly logs   - Referral MFM and Dietician on 2024   - sugars remain normal 24  FmHx VTE (mother) - thrombophilia workup?  Hx miscarriage x2    [x]Placed on High Risk List updated 2024 KARINA Diaz - CNM     Fetal

## 2024-08-05 NOTE — PROGRESS NOTES
Gestational age 34W0D  Indications HX PREE, CHTN, GESTATIONAL DIABETES, FM HX VTE, HX MISCARRIAGE X2  NST started @  BP @  Pulse @  Weight @  Patient recording movements. Patient stated fetal movement @

## 2024-08-05 NOTE — PROGRESS NOTES
Gestational age @ 34w0d  Indications @ HTN, GDM, hx of Pre-E  NST started @ 0954  BP @ 164/94, retake at 158/70   Weight @ 198lb   Patient recording movements. Patient stated fetal movement active

## 2024-08-05 NOTE — FLOWSHEET NOTE
Pt arrived with complaints of headache aching in nature and  elevated blood pressure in the office. Pt denies any blurred vision dizzinees, SOB or chest pain at this time. Positive fetal movement.

## 2024-08-05 NOTE — PROGRESS NOTES
Obstetric/Gynecology Resident Interval Note    To patient's room to discuss lab results. CBC and CMP normal. Patient's P/C ratio is elevated at 0.61. She has met diagnosis for Chronic Hypertension (on meds) with Superimposed Preeclampsia without Severe Features. This diagnosis and delivery indications were discussed with patient. Patient understands diagnosis and indication to deliver at 37w0d. All questions and concerns were addressed.   Patient states that she feels better since arrival and reports in improvement of headache since arrival. Declines additional medications for headaches. She was started on Labetalol 200 mg BID during this admission; BP has improved since she received her first dose at 1248. BP elevated, non-severe. Continue to monitor blood pressure.    Vitals:    08/05/24 1300 08/05/24 1331 08/05/24 1400 08/05/24 1430   BP: (!) 152/101 (!) 131/92 (!) 135/91 136/87   Pulse: (!) 112 (!) 103 (!) 102 (!) 102   Resp: 16   16   Temp:       TempSrc:       SpO2:         Recent Results (from the past 12 hour(s))   Protein / Creatinine Ratio, Urine    Collection Time: 08/05/24 12:39 PM   Result Value Ref Range    Total Protein, Urine 7 mg/dL    Creatinine, Ur 11.7 (L) 28.0 - 217.0 mg/dL    Urine Total Protein Creatinine Ratio 0.61    CBC with Auto Differential    Collection Time: 08/05/24 12:42 PM   Result Value Ref Range    WBC 10.9 3.5 - 11.3 k/uL    RBC 4.08 3.95 - 5.11 m/uL    Hemoglobin 12.0 11.9 - 15.1 g/dL    Hematocrit 35.8 (L) 36.3 - 47.1 %    MCV 87.7 82.6 - 102.9 fL    MCH 29.4 25.2 - 33.5 pg    MCHC 33.5 28.4 - 34.8 g/dL    RDW 13.9 11.8 - 14.4 %    Platelets 236 138 - 453 k/uL    MPV 11.6 8.1 - 13.5 fL    NRBC Automated 0.0 0.0 per 100 WBC    Neutrophils % 74 (H) 36 - 65 %    Lymphocytes % 18 (L) 24 - 43 %    Monocytes % 6 3 - 12 %    Eosinophils % 1 1 - 4 %    Basophils % 0 0 - 2 %    Immature Granulocytes % 1 (H) 0 %    Neutrophils Absolute 8.17 (H) 1.50 - 8.10 k/uL    Lymphocytes Absolute

## 2024-08-05 NOTE — H&P
April Valentine APRN - CNP   Lancets MISC 1 each by Does not apply route daily 6/24/24   April Valentine APRN - CNP   ondansetron (ZOFRAN) 4 MG tablet Take 1 tablet by mouth daily as needed for Nausea or Vomiting 3/15/24   Irasema Wu APRN - CNM   aspirin 81 MG EC tablet Take 1 tablet by mouth daily 2/15/24   Irasema Wu APRN - CNM   ondansetron (ZOFRAN-ODT) 4 MG disintegrating tablet Take 1 tablet by mouth 3 times daily as needed for Nausea or Vomiting 6/8/23   Irasema Thornton DO   Prenatal Vit w/Qb-Pyvhmwezc-AW (PNV PO) Take by mouth    Provider, MD Aníbal       FAMILY HISTORY:  family history is not on file.    SOCIAL HISTORY:   reports that she has never smoked. She has never used smokeless tobacco. She reports that she does not currently use alcohol. She reports that she does not use drugs.    VITALS:  Vitals:    08/05/24 1202 08/05/24 1233 08/05/24 1300   BP: (!) 158/99 (!) 147/104 (!) 152/101   Pulse: (!) 121 (!) 123 (!) 112   Resp: 16 16 16   Temp: 98.2 °F (36.8 °C)     TempSrc: Oral     SpO2: 98%         PHYSICAL EXAM:  Fetal Heart Monitor:  Baseline Heart Rate 140, moderate variability, present accelerations, absent decelerations  [place]: no contractions    General appearance:  no apparent distress, alert, and cooperative  HEENT: head atraumatic, normocephalic, moist mucous membranes, trachea midline  Neurologic:  alert, oriented, normal speech, no focal findings or movement disorder noted  Lungs:  No increased work of breathing, good air exchange  Heart:  tachycardic  Abdomen:  soft, gravid, non-tender, and no rebound, guarding, or rigidity, no signs of placenta abruption or chorioamnionitis  Extremities:  no calf tenderness, non edematous  Musculoskeletal: Gross strength equal and intact throughout, no gross abnormalities, range of motion normal in hips, knees, shoulders and spine  Psychiatric: Mood appropriate, normal affect       PRENATAL LAB RESULTS:  Blood

## 2024-08-05 NOTE — PROGRESS NOTES
Obstetric/Gynecology Resident Interval Note    Patient's blood pressures have stabilized since admission. Patient endorses dull headache but says that is has greatly improved since administration of Tylenol. Denies other s/sx of Preeclampsia. Patient was counseled and understanding of strict return precautions. Patient instructed to continue Labetalol 200 mg BID; patient amenable. Rx sent to home pharmacy. Patient tracks BP at home and will continue to do so. Tracing reassuring and patient is stable for discharge. Patient will be discharged home with close follow up. Her next appointment is tomorrow with [facility].     Fetal Heart Monitor:  Baseline Heart Rate 140, moderate variability, present accelerations, absent decelerations  [place]: no contractions    Vitals:    08/05/24 1400 08/05/24 1430 08/05/24 1500 08/05/24 1603   BP: (!) 135/91 136/87 128/84 (!) 135/91   Pulse: (!) 102 (!) 102 (!) 103 100   Resp:  16     Temp:       TempSrc:       SpO2:             Senior resident and attending updated and in agreement with plan.       Lillian Whyte MD  OB/GYN Resident, PGY1  [place], [place]  8/5/2024, 4:38 PM    Resident Physician Attestation    I was present with the resident physician during the history and exam. I discussed the findings and plans with the resident physician and agree as documented in her note     Michaela Nation DO  OB/GYN Resident PGY4  [facility]

## 2024-08-06 ENCOUNTER — ROUTINE PRENATAL (OUTPATIENT)
Dept: PERINATAL CARE | Age: 29
End: 2024-08-06
Payer: COMMERCIAL

## 2024-08-06 VITALS
TEMPERATURE: 97.5 F | HEART RATE: 95 BPM | DIASTOLIC BLOOD PRESSURE: 74 MMHG | WEIGHT: 200 LBS | SYSTOLIC BLOOD PRESSURE: 107 MMHG | RESPIRATION RATE: 16 BRPM | HEIGHT: 66 IN | BODY MASS INDEX: 32.14 KG/M2

## 2024-08-06 DIAGNOSIS — O24.410 DIET CONTROLLED GESTATIONAL DIABETES MELLITUS (GDM), ANTEPARTUM: ICD-10-CM

## 2024-08-06 DIAGNOSIS — O36.60X0 EXCESSIVE FETAL GROWTH AFFECTING PREGNANCY, ANTEPARTUM, SINGLE OR UNSPECIFIED FETUS: ICD-10-CM

## 2024-08-06 DIAGNOSIS — O16.3 HYPERTENSION AFFECTING PREGNANCY IN THIRD TRIMESTER: Primary | ICD-10-CM

## 2024-08-06 DIAGNOSIS — O09.293 HISTORY OF PRE-ECLAMPSIA IN PRIOR PREGNANCY, CURRENTLY PREGNANT, THIRD TRIMESTER: ICD-10-CM

## 2024-08-06 DIAGNOSIS — O99.413 MATERNAL CARDIOVASCULAR DISEASE AFFECTING PREGNANCY IN THIRD TRIMESTER: ICD-10-CM

## 2024-08-06 PROCEDURE — 76819 FETAL BIOPHYS PROFIL W/O NST: CPT | Performed by: OBSTETRICS & GYNECOLOGY

## 2024-08-06 PROCEDURE — 76820 UMBILICAL ARTERY ECHO: CPT | Performed by: OBSTETRICS & GYNECOLOGY

## 2024-08-06 PROCEDURE — 99999 PR OFFICE/OUTPT VISIT,PROCEDURE ONLY: CPT | Performed by: OBSTETRICS & GYNECOLOGY

## 2024-08-12 ENCOUNTER — HOSPITAL ENCOUNTER (OUTPATIENT)
Age: 29
Setting detail: SPECIMEN
Discharge: HOME | End: 2024-08-12

## 2024-08-12 ENCOUNTER — ROUTINE PRENATAL (OUTPATIENT)
Dept: OBGYN CLINIC | Age: 29
End: 2024-08-12

## 2024-08-12 VITALS
SYSTOLIC BLOOD PRESSURE: 128 MMHG | WEIGHT: 202.2 LBS | HEART RATE: 93 BPM | BODY MASS INDEX: 32.64 KG/M2 | DIASTOLIC BLOOD PRESSURE: 82 MMHG

## 2024-08-12 DIAGNOSIS — R74.01 ELEVATED AST (SGOT): ICD-10-CM

## 2024-08-12 DIAGNOSIS — O16.1 ELEVATED BLOOD PRESSURE AFFECTING PREGNANCY IN FIRST TRIMESTER, ANTEPARTUM: ICD-10-CM

## 2024-08-12 DIAGNOSIS — Z3A.35 35 WEEKS GESTATION OF PREGNANCY: ICD-10-CM

## 2024-08-12 DIAGNOSIS — O16.1 ELEVATED BLOOD PRESSURE AFFECTING PREGNANCY IN FIRST TRIMESTER, ANTEPARTUM: Primary | ICD-10-CM

## 2024-08-12 DIAGNOSIS — O09.93 HIGH-RISK PREGNANCY IN THIRD TRIMESTER: Primary | ICD-10-CM

## 2024-08-12 DIAGNOSIS — Z67.91 BLOOD TYPE, RH NEGATIVE: ICD-10-CM

## 2024-08-12 DIAGNOSIS — O24.419 GESTATIONAL DIABETES MELLITUS (GDM) IN THIRD TRIMESTER, GESTATIONAL DIABETES METHOD OF CONTROL UNSPECIFIED: ICD-10-CM

## 2024-08-12 DIAGNOSIS — O09.93 HIGH-RISK PREGNANCY IN THIRD TRIMESTER: ICD-10-CM

## 2024-08-12 DIAGNOSIS — O36.60X0 EXCESSIVE FETAL GROWTH AFFECTING PREGNANCY, ANTEPARTUM, SINGLE OR UNSPECIFIED FETUS: ICD-10-CM

## 2024-08-12 LAB
ALBUMIN SERPL-MCNC: 3.5 G/DL (ref 3.5–5.2)
ALBUMIN/GLOB SERPL: 1 {RATIO} (ref 1–2.5)
ALP SERPL-CCNC: 128 U/L (ref 35–104)
ALT SERPL-CCNC: 35 U/L (ref 10–35)
ANION GAP SERPL CALCULATED.3IONS-SCNC: 11 MMOL/L (ref 9–16)
AST SERPL-CCNC: 37 U/L (ref 10–35)
BASOPHILS # BLD: 0.06 K/UL (ref 0–0.2)
BASOPHILS NFR BLD: 1 % (ref 0–2)
BILIRUB SERPL-MCNC: 0.5 MG/DL (ref 0–1.2)
BUN SERPL-MCNC: 10 MG/DL (ref 6–20)
CALCIUM SERPL-MCNC: 9.4 MG/DL (ref 8.6–10.4)
CHLORIDE SERPL-SCNC: 106 MMOL/L (ref 98–107)
CO2 SERPL-SCNC: 22 MMOL/L (ref 20–31)
CREAT SERPL-MCNC: 0.5 MG/DL (ref 0.5–0.9)
EOSINOPHIL # BLD: 0.12 K/UL (ref 0–0.44)
EOSINOPHILS RELATIVE PERCENT: 1 % (ref 1–4)
ERYTHROCYTE [DISTWIDTH] IN BLOOD BY AUTOMATED COUNT: 14.1 % (ref 11.8–14.4)
GFR, ESTIMATED: >90 ML/MIN/1.73M2
GLUCOSE SERPL-MCNC: 108 MG/DL (ref 74–99)
HCT VFR BLD AUTO: 35.7 % (ref 36.3–47.1)
HGB BLD-MCNC: 11.4 G/DL (ref 11.9–15.1)
IMM GRANULOCYTES # BLD AUTO: 0.11 K/UL (ref 0–0.3)
IMM GRANULOCYTES NFR BLD: 1 %
LYMPHOCYTES NFR BLD: 2.18 K/UL (ref 1.1–3.7)
LYMPHOCYTES RELATIVE PERCENT: 23 % (ref 24–43)
MCH RBC QN AUTO: 28.9 PG (ref 25.2–33.5)
MCHC RBC AUTO-ENTMCNC: 31.9 G/DL (ref 28.4–34.8)
MCV RBC AUTO: 90.6 FL (ref 82.6–102.9)
MONOCYTES NFR BLD: 0.62 K/UL (ref 0.1–1.2)
MONOCYTES NFR BLD: 6 % (ref 3–12)
NEUTROPHILS NFR BLD: 68 % (ref 36–65)
NEUTS SEG NFR BLD: 6.6 K/UL (ref 1.5–8.1)
NRBC BLD-RTO: 0 PER 100 WBC
PLATELET # BLD AUTO: 221 K/UL (ref 138–453)
PMV BLD AUTO: 11.7 FL (ref 8.1–13.5)
POTASSIUM SERPL-SCNC: 4.2 MMOL/L (ref 3.7–5.3)
PROT SERPL-MCNC: 6.3 G/DL (ref 6.6–8.7)
RBC # BLD AUTO: 3.94 M/UL (ref 3.95–5.11)
SODIUM SERPL-SCNC: 139 MMOL/L (ref 136–145)
WBC OTHER # BLD: 9.7 K/UL (ref 3.5–11.3)

## 2024-08-12 PROCEDURE — 0502F SUBSEQUENT PRENATAL CARE: CPT | Performed by: NURSE PRACTITIONER

## 2024-08-12 NOTE — PROGRESS NOTES
Gestational age 35w0d  Indications cHTN, HX PREE, GESTATIONAL DIABETES, FM HX VTE-MOTHER, HX MISCARRIAGE X 2  NST started 1:07  /82  Pulse 93  Weight 202.2LB  Patient recording movements. Patient stated fetal movement ACTIVE, NO ISSUES  
w/ GERMÁN w/o SF  rNST, BPP scheduled at [facility] tomorrow  - Fetal nonstress test; Future  - Comprehensive Metabolic Panel; Future  - CBC with Auto Differential; Future  - Culture, Strep B Screen, Vaginal/Rectal; Future  She had history of chronic hypertension she was seen in the office on 8/5/2024 and she was having headaches and her blood pressure was 164/94.  She was sent into labor and delivery for evaluation she was started on labetalol.  She had lab work and her protein creatinine ratio was elevated at 0.61 and was diagnosed with cHTN with GERMÁN w/o SF  She has been taking labetalol now and checking her blood pressures at home and they have all been less than 140/90.  She states that she has not had any more headaches since last week when she went in to labor and delivery.    The American College of Obstetricians and Gynecologists considers ambulatory management at home an option for patients with preeclampsia without severe features as long as the patient is well informed and frequent maternal and fetal monitoring is performed, including blood pressure, ultrasonography, and laboratory tests (platelet count, serum creatinine, liver enzymes.  These tests should be repeated at least once to twice weekly in patients with preeclampsia without severe features to assess for disease progression, and more often if clinical signs and symptoms suggest worsening disease.  At a minimum, we perform either twice weekly nonstress testing (NST) plus assessment of amniotic fluid volume or twice weekly biophysical profiles (BPP) beginning at the time of preeclampsia diagnosis, and also suggest that patients perform daily fetal movement counts. Fetal testing (NST or BPP) should be performed promptly if there is an abrupt change in maternal condition or decreased fetal activity.    Outpatient monitoring -- Patients offered outpatient monitoring should be:  ?Well-informed and understand the importance of contacting their health care

## 2024-08-13 ENCOUNTER — ROUTINE PRENATAL (OUTPATIENT)
Dept: PERINATAL CARE | Age: 29
End: 2024-08-13
Payer: COMMERCIAL

## 2024-08-13 VITALS
RESPIRATION RATE: 16 BRPM | HEIGHT: 66 IN | WEIGHT: 201 LBS | BODY MASS INDEX: 32.3 KG/M2 | DIASTOLIC BLOOD PRESSURE: 100 MMHG | HEART RATE: 102 BPM | SYSTOLIC BLOOD PRESSURE: 146 MMHG | TEMPERATURE: 98.3 F

## 2024-08-13 DIAGNOSIS — O36.60X0 EXCESSIVE FETAL GROWTH AFFECTING PREGNANCY, ANTEPARTUM, SINGLE OR UNSPECIFIED FETUS: ICD-10-CM

## 2024-08-13 DIAGNOSIS — O09.293 HISTORY OF PRE-ECLAMPSIA IN PRIOR PREGNANCY, CURRENTLY PREGNANT, THIRD TRIMESTER: ICD-10-CM

## 2024-08-13 DIAGNOSIS — O16.1 ELEVATED BLOOD PRESSURE AFFECTING PREGNANCY IN FIRST TRIMESTER, ANTEPARTUM: ICD-10-CM

## 2024-08-13 DIAGNOSIS — O99.413 MATERNAL CARDIOVASCULAR DISEASE AFFECTING PREGNANCY IN THIRD TRIMESTER: ICD-10-CM

## 2024-08-13 DIAGNOSIS — I10 CHRONIC HYPERTENSION: Primary | ICD-10-CM

## 2024-08-13 DIAGNOSIS — O24.410 DIET CONTROLLED GESTATIONAL DIABETES MELLITUS (GDM), ANTEPARTUM: ICD-10-CM

## 2024-08-13 DIAGNOSIS — Z3A.35 35 WEEKS GESTATION OF PREGNANCY: ICD-10-CM

## 2024-08-13 DIAGNOSIS — O11.9 CHRONIC HYPERTENSION WITH SUPERIMPOSED PRE-ECLAMPSIA: Primary | ICD-10-CM

## 2024-08-13 DIAGNOSIS — O99.213 OBESITY AFFECTING PREGNANCY IN THIRD TRIMESTER, UNSPECIFIED OBESITY TYPE: ICD-10-CM

## 2024-08-13 DIAGNOSIS — O10.919 CHRONIC HYPERTENSION AFFECTING PREGNANCY: ICD-10-CM

## 2024-08-13 PROCEDURE — 76819 FETAL BIOPHYS PROFIL W/O NST: CPT | Performed by: OBSTETRICS & GYNECOLOGY

## 2024-08-13 PROCEDURE — 99999 PR OFFICE/OUTPT VISIT,PROCEDURE ONLY: CPT | Performed by: OBSTETRICS & GYNECOLOGY

## 2024-08-13 PROCEDURE — 76820 UMBILICAL ARTERY ECHO: CPT | Performed by: OBSTETRICS & GYNECOLOGY

## 2024-08-13 RX ORDER — LABETALOL 100 MG/1
300 TABLET, FILM COATED ORAL 3 TIMES DAILY
Qty: 60 TABLET | Refills: 3 | Status: ON HOLD | OUTPATIENT
Start: 2024-08-13 | End: 2024-08-21 | Stop reason: HOSPADM

## 2024-08-13 NOTE — PROGRESS NOTES
I would advise continuation of daily oral baby aspirin 81 mg based on guidelines by the USPSTF/ACOG (for preeclampsia prevention for pregnant women at \"high-risk\"  for preeclampsia).        Blood sugars reviewed (adequate glycemic control currently).     Please continue to send blood glucose monitoring information to MFM office so that insulin and/or dietary changes can be made if necessary weekly.  Diabetic education/nutrition counseling advised.   Start recommended ADA diet therapy for diabetes.   Compliance with regular prenatal care and blood sugar monitoring strongly encouraged.      Strongly advised patient to be compliant with blood sugar monitoring as previously advised to minimize the potential of adverse  outcomes (e.g. fetal demise/stillbirth, polyhydramnios/oligohydramnios, fetal growth abnormalities, pregnancy loss, hypertensive disorders of pregnancy, indicated  delivery, etc.), potential maternal morbidities, etc.     Please refer to non-invasive prenatal testing/NIPT results (via [employer]).     Patient previously declined maternal carrier testing ([employer]'s Horizon Carrier Screen).      Patient previously sent for laboratory evaluation for acquired/inherited thrombophilias (testing for antiphospholipid antibody syndrome, Factor V Leiden mutation, Prothrombin gene mutation, MTHFR 677/1298 mutation, etc) given family history of a thromboembolic event in a first-degree relative - not drawn.     Adult cardiology consultation obtained - patient did not schedule.    Patient previously declined a Maternal-Fetal Medicine complete follow-up physician consultation regarding the fetal and/or maternal medical/obstetrical complications/co-morbidities of pregnancy (specifically for chronic hypertension with newly-diagnosed superimposed preeclampsia).      Maternal-Fetal Medicine (MFM) attending physician will defer all management for these medical/obstetrical complications of pregnancy to the primary

## 2024-08-13 NOTE — PROGRESS NOTES
Obstetric/Gynecology Maternal Fetal Medicine Resident Note    Resident to room to evaluate patient as she had 2 elevated non severe range blood pressures. The patient denies s/sx preeclampsia and is asymptomatic. She has a known diagnosis of cHTN (meds) w/ GERMÁN w/o SF and is taking Labetalol 200 BID.    Discussed plan of care with Dr. Francisco and because patient is clinically asymptomatic and had preeclampsia labs that were completed yesterday, plan to increase Labetalol to 300 TID and schedule a follow up in primary OBGYN office for a BP check on 8/15/24.    Reviewed s/sx of preeclampsia as well as severe range blood pressures and when to return back to the hospital. Patient states understanding.    Brenda Swenson DO  OBGYN Resident, PGY2  [facility]  8/13/2024, 8:06 AM

## 2024-08-14 ENCOUNTER — TELEPHONE (OUTPATIENT)
Dept: OBGYN CLINIC | Age: 29
End: 2024-08-14

## 2024-08-14 NOTE — TELEPHONE ENCOUNTER
Patient stated that she had appt with you on Monday and was told to let you know if work was getting to be too much. Patient is set to be induced next week. Okay to start her maternity leave now? She understands that it would mean 1 less week with baby here.

## 2024-08-15 ENCOUNTER — HOSPITAL ENCOUNTER (OUTPATIENT)
Age: 29
Discharge: HOME | End: 2024-08-15
Payer: COMMERCIAL

## 2024-08-15 DIAGNOSIS — Z3A.35 35 WEEKS GESTATION OF PREGNANCY: ICD-10-CM

## 2024-08-15 DIAGNOSIS — O16.1 ELEVATED BLOOD PRESSURE AFFECTING PREGNANCY IN FIRST TRIMESTER, ANTEPARTUM: Primary | ICD-10-CM

## 2024-08-15 LAB
ALBUMIN SERPL-MCNC: 3.2 G/DL (ref 3.5–5.2)
ALBUMIN/GLOB SERPL: 1 {RATIO} (ref 1–2.5)
ALP SERPL-CCNC: 121 U/L (ref 35–104)
ALT SERPL-CCNC: 40 U/L (ref 10–35)
ANION GAP SERPL CALCULATED.3IONS-SCNC: 8 MMOL/L (ref 9–16)
AST SERPL-CCNC: 37 U/L (ref 10–35)
BASOPHILS # BLD: 0.05 K/UL (ref 0–0.2)
BASOPHILS NFR BLD: 1 % (ref 0–2)
BILIRUB SERPL-MCNC: 0.4 MG/DL (ref 0–1.2)
BUN SERPL-MCNC: 8 MG/DL (ref 6–20)
CALCIUM SERPL-MCNC: 9.1 MG/DL (ref 8.6–10.4)
CHLORIDE SERPL-SCNC: 109 MMOL/L (ref 98–107)
CO2 SERPL-SCNC: 22 MMOL/L (ref 20–31)
CREAT SERPL-MCNC: 0.5 MG/DL (ref 0.5–0.9)
EOSINOPHIL # BLD: 0.21 K/UL (ref 0–0.44)
EOSINOPHILS RELATIVE PERCENT: 2 % (ref 1–4)
ERYTHROCYTE [DISTWIDTH] IN BLOOD BY AUTOMATED COUNT: 14.2 % (ref 11.8–14.4)
GFR, ESTIMATED: >90 ML/MIN/1.73M2
GLUCOSE SERPL-MCNC: 103 MG/DL (ref 74–99)
HCT VFR BLD AUTO: 34.8 % (ref 36.3–47.1)
HGB BLD-MCNC: 11.1 G/DL (ref 11.9–15.1)
IMM GRANULOCYTES # BLD AUTO: 0.1 K/UL (ref 0–0.3)
IMM GRANULOCYTES NFR BLD: 1 %
LYMPHOCYTES NFR BLD: 1.99 K/UL (ref 1.1–3.7)
LYMPHOCYTES RELATIVE PERCENT: 21 % (ref 24–43)
MCH RBC QN AUTO: 28.7 PG (ref 25.2–33.5)
MCHC RBC AUTO-ENTMCNC: 31.9 G/DL (ref 28.4–34.8)
MCV RBC AUTO: 89.9 FL (ref 82.6–102.9)
MICROORGANISM SPEC CULT: NORMAL
MONOCYTES NFR BLD: 0.68 K/UL (ref 0.1–1.2)
MONOCYTES NFR BLD: 7 % (ref 3–12)
NEUTROPHILS NFR BLD: 68 % (ref 36–65)
NEUTS SEG NFR BLD: 6.65 K/UL (ref 1.5–8.1)
NRBC BLD-RTO: 0 PER 100 WBC
PLATELET # BLD AUTO: 204 K/UL (ref 138–453)
PMV BLD AUTO: 11.6 FL (ref 8.1–13.5)
POTASSIUM SERPL-SCNC: 4.1 MMOL/L (ref 3.7–5.3)
PROT SERPL-MCNC: 5.6 G/DL (ref 6.6–8.7)
RBC # BLD AUTO: 3.87 M/UL (ref 3.95–5.11)
SERVICE CMNT-IMP: NORMAL
SODIUM SERPL-SCNC: 139 MMOL/L (ref 136–145)
SPECIMEN DESCRIPTION: NORMAL
WBC OTHER # BLD: 9.7 K/UL (ref 3.5–11.3)

## 2024-08-15 PROCEDURE — 36415 COLL VENOUS BLD VENIPUNCTURE: CPT

## 2024-08-15 PROCEDURE — 80053 COMPREHEN METABOLIC PANEL: CPT

## 2024-08-15 PROCEDURE — 85025 COMPLETE CBC W/AUTO DIFF WBC: CPT

## 2024-08-16 ENCOUNTER — NURSE ONLY (OUTPATIENT)
Dept: OBGYN CLINIC | Age: 29
End: 2024-08-16

## 2024-08-16 VITALS — WEIGHT: 200.5 LBS | SYSTOLIC BLOOD PRESSURE: 124 MMHG | DIASTOLIC BLOOD PRESSURE: 80 MMHG | BODY MASS INDEX: 32.36 KG/M2

## 2024-08-16 DIAGNOSIS — Z3A.35 35 WEEKS GESTATION OF PREGNANCY: ICD-10-CM

## 2024-08-16 DIAGNOSIS — O16.1 ELEVATED BLOOD PRESSURE AFFECTING PREGNANCY IN FIRST TRIMESTER, ANTEPARTUM: Primary | ICD-10-CM

## 2024-08-16 DIAGNOSIS — Z01.30 BLOOD PRESSURE CHECK: ICD-10-CM

## 2024-08-16 NOTE — PROGRESS NOTES
PT WAS HER FOR BP CHECK ONLY- PT DENIES ANY ABNORMAL SWELLING, HEADACHES OR BLURRED VISION- SHE WAS INFORMED OF HER LABS AND RECOMMENDATIONS.     BP WERE REPORTED TO DR FOX WHO STATED PT IS OK TO GO HOME- RT NEXT WEEK FOR SCHEDULED APPOINTMENTS.     PT AWARE AND SIGNS TO LOOK FOR AND WHEN TO [facility].

## 2024-08-18 ENCOUNTER — HOSPITAL ENCOUNTER (INPATIENT)
Age: 29
LOS: 3 days | Discharge: HOME | End: 2024-08-21
Attending: STUDENT IN AN ORGANIZED HEALTH CARE EDUCATION/TRAINING PROGRAM | Admitting: STUDENT IN AN ORGANIZED HEALTH CARE EDUCATION/TRAINING PROGRAM
Payer: COMMERCIAL

## 2024-08-18 PROBLEM — Z3A.34 34 WEEKS GESTATION OF PREGNANCY: Status: RESOLVED | Noted: 2024-08-05 | Resolved: 2024-08-18

## 2024-08-18 PROBLEM — Z3A.35 35 WEEKS GESTATION OF PREGNANCY: Status: ACTIVE | Noted: 2024-08-18

## 2024-08-18 LAB
ALBUMIN SERPL-MCNC: 3.2 G/DL (ref 3.5–5.2)
ALBUMIN/GLOB SERPL: 1 {RATIO} (ref 1–2.5)
ALP SERPL-CCNC: 121 U/L (ref 35–104)
ALT SERPL-CCNC: 41 U/L (ref 10–35)
ANION GAP SERPL CALCULATED.3IONS-SCNC: 11 MMOL/L (ref 9–16)
AST SERPL-CCNC: 40 U/L (ref 10–35)
BASOPHILS # BLD: <0.03 K/UL (ref 0–0.2)
BASOPHILS NFR BLD: 0 % (ref 0–2)
BILIRUB SERPL-MCNC: 0.5 MG/DL (ref 0–1.2)
BUN SERPL-MCNC: 10 MG/DL (ref 6–20)
CALCIUM SERPL-MCNC: 9 MG/DL (ref 8.6–10.4)
CHLORIDE SERPL-SCNC: 107 MMOL/L (ref 98–107)
CO2 SERPL-SCNC: 19 MMOL/L (ref 20–31)
CREAT SERPL-MCNC: 0.5 MG/DL (ref 0.5–0.9)
EOSINOPHIL # BLD: 0.14 K/UL (ref 0–0.44)
EOSINOPHILS RELATIVE PERCENT: 2 % (ref 1–4)
ERYTHROCYTE [DISTWIDTH] IN BLOOD BY AUTOMATED COUNT: 14.3 % (ref 11.8–14.4)
GFR, ESTIMATED: >90 ML/MIN/1.73M2
GLUCOSE SERPL-MCNC: 106 MG/DL (ref 74–99)
HCT VFR BLD AUTO: 33.8 % (ref 36.3–47.1)
HGB BLD-MCNC: 11 G/DL (ref 11.9–15.1)
IMM GRANULOCYTES # BLD AUTO: 0.08 K/UL (ref 0–0.3)
IMM GRANULOCYTES NFR BLD: 1 %
LYMPHOCYTES NFR BLD: 1.93 K/UL (ref 1.1–3.7)
LYMPHOCYTES RELATIVE PERCENT: 21 % (ref 24–43)
MCH RBC QN AUTO: 28.6 PG (ref 25.2–33.5)
MCHC RBC AUTO-ENTMCNC: 32.5 G/DL (ref 28.4–34.8)
MCV RBC AUTO: 88 FL (ref 82.6–102.9)
MONOCYTES NFR BLD: 0.64 K/UL (ref 0.1–1.2)
MONOCYTES NFR BLD: 7 % (ref 3–12)
NEUTROPHILS NFR BLD: 69 % (ref 36–65)
NEUTS SEG NFR BLD: 6.22 K/UL (ref 1.5–8.1)
NRBC BLD-RTO: 0 PER 100 WBC
PLATELET # BLD AUTO: 182 K/UL (ref 138–453)
PMV BLD AUTO: 11.3 FL (ref 8.1–13.5)
POTASSIUM SERPL-SCNC: 3.9 MMOL/L (ref 3.7–5.3)
PROT SERPL-MCNC: 5.8 G/DL (ref 6.6–8.7)
RBC # BLD AUTO: 3.84 M/UL (ref 3.95–5.11)
SODIUM SERPL-SCNC: 137 MMOL/L (ref 136–145)
WBC OTHER # BLD: 9 K/UL (ref 3.5–11.3)

## 2024-08-18 PROCEDURE — 86850 RBC ANTIBODY SCREEN: CPT

## 2024-08-18 PROCEDURE — 80053 COMPREHEN METABOLIC PANEL: CPT

## 2024-08-18 PROCEDURE — 6370000000 HC RX 637 (ALT 250 FOR IP): Performed by: STUDENT IN AN ORGANIZED HEALTH CARE EDUCATION/TRAINING PROGRAM

## 2024-08-18 PROCEDURE — 3E033VJ INTRODUCTION OF OTHER HORMONE INTO PERIPHERAL VEIN, PERCUTANEOUS APPROACH: ICD-10-PCS | Performed by: OBSTETRICS & GYNECOLOGY

## 2024-08-18 PROCEDURE — 86901 BLOOD TYPING SEROLOGIC RH(D): CPT

## 2024-08-18 PROCEDURE — 86780 TREPONEMA PALLIDUM: CPT

## 2024-08-18 PROCEDURE — 36415 COLL VENOUS BLD VENIPUNCTURE: CPT

## 2024-08-18 PROCEDURE — 3E0DXGC INTRODUCTION OF OTHER THERAPEUTIC SUBSTANCE INTO MOUTH AND PHARYNX, EXTERNAL APPROACH: ICD-10-PCS | Performed by: OBSTETRICS & GYNECOLOGY

## 2024-08-18 PROCEDURE — 85025 COMPLETE CBC W/AUTO DIFF WBC: CPT

## 2024-08-18 PROCEDURE — 86900 BLOOD TYPING SEROLOGIC ABO: CPT

## 2024-08-18 PROCEDURE — 1220000000 HC SEMI PRIVATE OB R&B

## 2024-08-18 PROCEDURE — 80307 DRUG TEST PRSMV CHEM ANLYZR: CPT

## 2024-08-18 PROCEDURE — 6370000000 HC RX 637 (ALT 250 FOR IP)

## 2024-08-18 RX ORDER — SODIUM CHLORIDE, SODIUM LACTATE, POTASSIUM CHLORIDE, AND CALCIUM CHLORIDE .6; .31; .03; .02 G/100ML; G/100ML; G/100ML; G/100ML
500 INJECTION, SOLUTION INTRAVENOUS PRN
Status: DISCONTINUED | OUTPATIENT
Start: 2024-08-18 | End: 2024-08-19

## 2024-08-18 RX ORDER — ONDANSETRON 2 MG/ML
4 INJECTION INTRAMUSCULAR; INTRAVENOUS EVERY 6 HOURS PRN
Status: DISCONTINUED | OUTPATIENT
Start: 2024-08-18 | End: 2024-08-19

## 2024-08-18 RX ORDER — SODIUM CHLORIDE, SODIUM LACTATE, POTASSIUM CHLORIDE, CALCIUM CHLORIDE 600; 310; 30; 20 MG/100ML; MG/100ML; MG/100ML; MG/100ML
INJECTION, SOLUTION INTRAVENOUS CONTINUOUS
Status: DISCONTINUED | OUTPATIENT
Start: 2024-08-18 | End: 2024-08-19

## 2024-08-18 RX ORDER — MAGNESIUM SULFATE HEPTAHYDRATE 40 MG/ML
4000 INJECTION, SOLUTION INTRAVENOUS ONCE
Status: COMPLETED | OUTPATIENT
Start: 2024-08-18 | End: 2024-08-19

## 2024-08-18 RX ORDER — ACETAMINOPHEN 500 MG
1000 TABLET ORAL EVERY 6 HOURS PRN
Status: DISCONTINUED | OUTPATIENT
Start: 2024-08-18 | End: 2024-08-19

## 2024-08-18 RX ORDER — LABETALOL 200 MG/1
400 TABLET, FILM COATED ORAL 3 TIMES DAILY
Status: DISCONTINUED | OUTPATIENT
Start: 2024-08-18 | End: 2024-08-21 | Stop reason: HOSPADM

## 2024-08-18 RX ORDER — DIPHENHYDRAMINE HCL 25 MG
25 TABLET ORAL ONCE
Status: COMPLETED | OUTPATIENT
Start: 2024-08-18 | End: 2024-08-18

## 2024-08-18 RX ORDER — SODIUM CHLORIDE 0.9 % (FLUSH) 0.9 %
5-40 SYRINGE (ML) INJECTION EVERY 12 HOURS SCHEDULED
Status: DISCONTINUED | OUTPATIENT
Start: 2024-08-18 | End: 2024-08-19

## 2024-08-18 RX ORDER — ACETAMINOPHEN 500 MG
1000 TABLET ORAL EVERY 8 HOURS SCHEDULED
Status: DISCONTINUED | OUTPATIENT
Start: 2024-08-18 | End: 2024-08-18

## 2024-08-18 RX ORDER — SODIUM CHLORIDE 0.9 % (FLUSH) 0.9 %
5-40 SYRINGE (ML) INJECTION PRN
Status: DISCONTINUED | OUTPATIENT
Start: 2024-08-18 | End: 2024-08-19

## 2024-08-18 RX ORDER — LANOLIN ALCOHOL/MO/W.PET/CERES
400 CREAM (GRAM) TOPICAL DAILY
Status: DISCONTINUED | OUTPATIENT
Start: 2024-08-18 | End: 2024-08-21 | Stop reason: HOSPADM

## 2024-08-18 RX ORDER — CALCIUM GLUCONATE 94 MG/ML
1000 INJECTION, SOLUTION INTRAVENOUS PRN
Status: DISCONTINUED | OUTPATIENT
Start: 2024-08-18 | End: 2024-08-21 | Stop reason: HOSPADM

## 2024-08-18 RX ORDER — MECLIZINE HCL 12.5 MG 12.5 MG/1
12.5 TABLET ORAL 3 TIMES DAILY PRN
Status: DISCONTINUED | OUTPATIENT
Start: 2024-08-18 | End: 2024-08-21 | Stop reason: HOSPADM

## 2024-08-18 RX ORDER — METOCLOPRAMIDE 10 MG/1
10 TABLET ORAL ONCE
Status: COMPLETED | OUTPATIENT
Start: 2024-08-18 | End: 2024-08-18

## 2024-08-18 RX ORDER — ONDANSETRON 4 MG/1
4 TABLET, ORALLY DISINTEGRATING ORAL EVERY 6 HOURS PRN
Status: DISCONTINUED | OUTPATIENT
Start: 2024-08-18 | End: 2024-08-19

## 2024-08-18 RX ORDER — SODIUM CHLORIDE 9 MG/ML
25 INJECTION, SOLUTION INTRAVENOUS PRN
Status: DISCONTINUED | OUTPATIENT
Start: 2024-08-18 | End: 2024-08-19

## 2024-08-18 RX ADMIN — Medication 400 MG: at 20:12

## 2024-08-18 RX ADMIN — DIPHENHYDRAMINE HYDROCHLORIDE 25 MG: 25 TABLET ORAL at 16:52

## 2024-08-18 RX ADMIN — LABETALOL HYDROCHLORIDE 400 MG: 200 TABLET, FILM COATED ORAL at 20:12

## 2024-08-18 RX ADMIN — METOCLOPRAMIDE 10 MG: 10 TABLET ORAL at 16:51

## 2024-08-18 NOTE — FLOWSHEET NOTE
Late entry due to hands on care     report from Radha rodriguez on this 29yo . Pt is resting on stretcher. POC discussed at this time. Pt states that she has no headache but still c/o vision changes. Pt wishes to have her dose of labetalol at . Spouse is present. FHR Cat 1. Denies needs.

## 2024-08-18 NOTE — H&P
OBSTETRICAL HISTORY AND PHYSICAL  [facility]    Date: 2024       Time: 4:53 PM   Patient Name: Renate Mcbride     Patient : 1995  Room[facility]Bed: [facility]-    Admission Date/Time: 2024  4:01 PM      CC: Elevated Bps, Headache, Blurry vision    HPI: Renate Mcbride is a 28 y.o.  at 35w6d who presents with complaint of elevated blood pressures, headache, and blurry vision. She reports that her blood pressures have been consistently 140s/90s despite taking her Labetalol, her headache has been mildly improved with Tylenol, however her blurry vision has worsened and is what prompted her to come to the hospital.      Patient denies any fever, chills, N/V, chest pain, shortness of breath, RUQ pain, abdominal pain, and increased swelling/tenderness in bilateral lower extremities. Patient denies any vaginal discharge and any urinary complaints. The patient reports fetal movement is present, denies contractions, denies loss of fluid, denies vaginal bleeding.    DATING:  LMP: Patient's last menstrual period was 2023 (exact date).  Estimated Date of Delivery: 24   Based on: LMP confirmed by US, at 9 1/7 weeks GA    PREGNANCY RISK FACTORS:  Patient Active Problem List   Diagnosis     20 M Apg 8/9 Wt 7#9    cHTN (meds) w/ GERMÁN w/o SF    Hx gHTN (G1)    FHx VTE    GDMA1 (G4)    Elevated AST (SGOT)    35 weeks gestation of pregnancy        Steroids Given In This Pregnancy:  no     REVIEW OF SYSTEMS:   Constitutional: negative fever, negative chills, negative weight changes   HEENT: positive visual disturbances, positive headaches, negative dizziness, negative hearing loss  Breast: Negative breast abnormalities, negative breast lumps, negative nipple discharge  Respiratory: negative dyspnea, negative cough, negative SOB  Cardiovascular: negative chest pain,  negative palpitations, negative arrhythmia, negative syncope   Gastrointestinal: negative abdominal pain,  Patient states understanding.   - Continue to monitor closely    Health Maintenance   - See below for other medical issues not specifically addressed    Patient Active Problem List    Diagnosis Date Noted    35 weeks gestation of pregnancy 2024    Elevated AST (SGOT) 2024    GDMA1 (G4) 2024    Hx gHTN (G1) 2024    FHx VTE 2024     Patient's mother with multiple blood clots      cHTN (meds) w/ GERMÁN w/o SF 2024     Newly diagnosed MFM on 24: Labetalol 200mg BID started. Met diagnosis for cHTN w/ GERMÁN w/o SF (P/C 0.61)  24: Labetalol increased to 300 TID       20 M Apg  Wt 7#9 2021       Plan discussed with Dr. Francisco, who is agreeable.     Steroids given this admission: No    Risks, benefits, alternatives and possible complications have been discussed in detail with the patient. Admission, and post admission procedures and expectations were discussed in detail. All questions were answered.    Attending's Name: Dr. Maryam Swenson DO  Ob/Gyn Resident  2024, 4:53 PM

## 2024-08-18 NOTE — PROGRESS NOTES
Obstetric/Gynecology Resident Interval Note    Patient seen and evaluated. She reports headache has improved by 70% but is still present after reglan/benadryl. She also complains of persistent blurry vision. Mag oxide and meclizine ordered for additional symptomatic relief. PreE labs wnl. BPs elevated 140s, non severe. Will reassess symptoms after medications.     Vitals:    08/18/24 1634 08/18/24 1701 08/18/24 1801 08/18/24 1901   BP: (!) 144/86 (!) 146/93 (!) 140/86 (!) 145/84   Pulse: 83 84 81 74   Resp: 16 18 16 16   Temp:       TempSrc:         Recent Results (from the past 4 hour(s))   CBC with Auto Differential    Collection Time: 08/18/24  4:31 PM   Result Value Ref Range    WBC 9.0 3.5 - 11.3 k/uL    RBC 3.84 (L) 3.95 - 5.11 m/uL    Hemoglobin 11.0 (L) 11.9 - 15.1 g/dL    Hematocrit 33.8 (L) 36.3 - 47.1 %    MCV 88.0 82.6 - 102.9 fL    MCH 28.6 25.2 - 33.5 pg    MCHC 32.5 28.4 - 34.8 g/dL    RDW 14.3 11.8 - 14.4 %    Platelets 182 138 - 453 k/uL    MPV 11.3 8.1 - 13.5 fL    NRBC Automated 0.0 0.0 per 100 WBC    Neutrophils % 69 (H) 36 - 65 %    Lymphocytes % 21 (L) 24 - 43 %    Monocytes % 7 3 - 12 %    Eosinophils % 2 1 - 4 %    Basophils % 0 0 - 2 %    Immature Granulocytes % 1 (H) 0 %    Neutrophils Absolute 6.22 1.50 - 8.10 k/uL    Lymphocytes Absolute 1.93 1.10 - 3.70 k/uL    Monocytes Absolute 0.64 0.10 - 1.20 k/uL    Eosinophils Absolute 0.14 0.00 - 0.44 k/uL    Basophils Absolute <0.03 0.00 - 0.20 k/uL    Immature Granulocytes Absolute 0.08 0.00 - 0.30 k/uL   Comprehensive Metabolic Panel    Collection Time: 08/18/24  4:31 PM   Result Value Ref Range    Sodium 137 136 - 145 mmol/L    Potassium 3.9 3.7 - 5.3 mmol/L    Chloride 107 98 - 107 mmol/L    CO2 19 (L) 20 - 31 mmol/L    Anion Gap 11 9 - 16 mmol/L    Glucose 106 (H) 74 - 99 mg/dL    BUN 10 6 - 20 mg/dL    Creatinine 0.5 0.50 - 0.90 mg/dL    Est, Glom Filt Rate >90 >60 mL/min/1.73m2    Calcium 9.0 8.6 - 10.4 mg/dL    Total Protein 5.8 (L) 6.6 -  8.7 g/dL    Albumin 3.2 (L) 3.5 - 5.2 g/dL    Albumin/Globulin Ratio 1.0 1.0 - 2.5    Total Bilirubin 0.5 0.00 - 1.20 mg/dL    Alkaline Phosphatase 121 (H) 35 - 104 U/L    ALT 41 (H) 10 - 35 U/L    AST 40 (H) 10 - 35 U/L     Dr. Francisco updated.     Trish Way DO  OB/GYN Resident, PGY4  [place], [place]  8/18/2024, 7:01 PM

## 2024-08-18 NOTE — FLOWSHEET NOTE
Patient arrives to L&D triage with c/o blurry vision and a headache when she woke up today.  Her headache was mostly relieved with tylenol.  She has been on schedule with taking her labetalol with last 300mg dose at 1230 today.  Patient denies any increase in swelling, reports +FM, denies any VB or LOF. Patient to TR-2 to gown et obtain CCUS.  notified of arrival.

## 2024-08-19 ENCOUNTER — ANESTHESIA EVENT (OUTPATIENT)
Dept: LABOR AND DELIVERY | Age: 29
End: 2024-08-19
Payer: COMMERCIAL

## 2024-08-19 ENCOUNTER — ANESTHESIA (OUTPATIENT)
Dept: LABOR AND DELIVERY | Age: 29
End: 2024-08-19
Payer: COMMERCIAL

## 2024-08-19 PROBLEM — O14.13 SEVERE PRE-ECLAMPSIA IN THIRD TRIMESTER: Status: ACTIVE | Noted: 2024-08-19

## 2024-08-19 PROBLEM — O10.919 CHRONIC HYPERTENSION AFFECTING PREGNANCY: Status: ACTIVE | Noted: 2024-08-19

## 2024-08-19 LAB
ABO + RH BLD: NORMAL
AMPHET UR QL SCN: NEGATIVE
ARM BAND NUMBER: NORMAL
BARBITURATES UR QL SCN: NEGATIVE
BASOPHILS # BLD: 0.06 K/UL (ref 0–0.2)
BASOPHILS NFR BLD: 1 % (ref 0–2)
BENZODIAZ UR QL: NEGATIVE
BLOOD BANK SAMPLE EXPIRATION: NORMAL
BLOOD GROUP ANTIBODIES SERPL: NEGATIVE
CANNABINOIDS UR QL SCN: NEGATIVE
COCAINE UR QL SCN: NEGATIVE
EOSINOPHIL # BLD: 0.1 K/UL (ref 0–0.44)
EOSINOPHILS RELATIVE PERCENT: 1 % (ref 1–4)
ERYTHROCYTE [DISTWIDTH] IN BLOOD BY AUTOMATED COUNT: 14.5 % (ref 11.8–14.4)
FENTANYL UR QL: NEGATIVE
GLUCOSE BLD-MCNC: 100 MG/DL (ref 65–105)
GLUCOSE BLD-MCNC: 117 MG/DL (ref 65–105)
GLUCOSE BLD-MCNC: 86 MG/DL (ref 65–105)
HCT VFR BLD AUTO: 35.2 % (ref 36.3–47.1)
HGB BLD-MCNC: 11.5 G/DL (ref 11.9–15.1)
IMM GRANULOCYTES # BLD AUTO: 0.1 K/UL (ref 0–0.3)
IMM GRANULOCYTES NFR BLD: 1 %
LYMPHOCYTES NFR BLD: 1.58 K/UL (ref 1.1–3.7)
LYMPHOCYTES RELATIVE PERCENT: 18 % (ref 24–43)
MAGNESIUM SERPL-MCNC: 5.1 MG/DL (ref 1.6–2.6)
MAGNESIUM SERPL-MCNC: 5.7 MG/DL (ref 1.6–2.6)
MCH RBC QN AUTO: 29.2 PG (ref 25.2–33.5)
MCHC RBC AUTO-ENTMCNC: 32.7 G/DL (ref 28.4–34.8)
MCV RBC AUTO: 89.3 FL (ref 82.6–102.9)
METHADONE UR QL: NEGATIVE
MONOCYTES NFR BLD: 0.56 K/UL (ref 0.1–1.2)
MONOCYTES NFR BLD: 6 % (ref 3–12)
NEUTROPHILS NFR BLD: 73 % (ref 36–65)
NEUTS SEG NFR BLD: 6.62 K/UL (ref 1.5–8.1)
NRBC BLD-RTO: 0 PER 100 WBC
OPIATES UR QL SCN: NEGATIVE
OXYCODONE UR QL SCN: NEGATIVE
PCP UR QL SCN: NEGATIVE
PLATELET # BLD AUTO: 191 K/UL (ref 138–453)
PMV BLD AUTO: 11.8 FL (ref 8.1–13.5)
RBC # BLD AUTO: 3.94 M/UL (ref 3.95–5.11)
RBC # BLD: ABNORMAL 10*6/UL
T PALLIDUM AB SER QL IA: NONREACTIVE
TEST INFORMATION: NORMAL
WBC OTHER # BLD: 9 K/UL (ref 3.5–11.3)

## 2024-08-19 PROCEDURE — 2580000003 HC RX 258

## 2024-08-19 PROCEDURE — 6370000000 HC RX 637 (ALT 250 FOR IP)

## 2024-08-19 PROCEDURE — 0HQ9XZZ REPAIR PERINEUM SKIN, EXTERNAL APPROACH: ICD-10-PCS | Performed by: OBSTETRICS & GYNECOLOGY

## 2024-08-19 PROCEDURE — 6360000002 HC RX W HCPCS

## 2024-08-19 PROCEDURE — 59400 OBSTETRICAL CARE: CPT | Performed by: OBSTETRICS & GYNECOLOGY

## 2024-08-19 PROCEDURE — 6370000000 HC RX 637 (ALT 250 FOR IP): Performed by: STUDENT IN AN ORGANIZED HEALTH CARE EDUCATION/TRAINING PROGRAM

## 2024-08-19 PROCEDURE — 2580000003 HC RX 258: Performed by: OBSTETRICS & GYNECOLOGY

## 2024-08-19 PROCEDURE — 6360000002 HC RX W HCPCS: Performed by: NURSE ANESTHETIST, CERTIFIED REGISTERED

## 2024-08-19 PROCEDURE — 82947 ASSAY GLUCOSE BLOOD QUANT: CPT

## 2024-08-19 PROCEDURE — 88307 TISSUE EXAM BY PATHOLOGIST: CPT

## 2024-08-19 PROCEDURE — 99221 1ST HOSP IP/OBS SF/LOW 40: CPT | Performed by: STUDENT IN AN ORGANIZED HEALTH CARE EDUCATION/TRAINING PROGRAM

## 2024-08-19 PROCEDURE — 3700000025 EPIDURAL BLOCK: Performed by: ANESTHESIOLOGY

## 2024-08-19 PROCEDURE — 85025 COMPLETE CBC W/AUTO DIFF WBC: CPT

## 2024-08-19 PROCEDURE — 10H07YZ INSERTION OF OTHER DEVICE INTO PRODUCTS OF CONCEPTION, VIA NATURAL OR ARTIFICIAL OPENING: ICD-10-PCS | Performed by: OBSTETRICS & GYNECOLOGY

## 2024-08-19 PROCEDURE — 36415 COLL VENOUS BLD VENIPUNCTURE: CPT

## 2024-08-19 PROCEDURE — 7200000001 HC VAGINAL DELIVERY

## 2024-08-19 PROCEDURE — 10907ZC DRAINAGE OF AMNIOTIC FLUID, THERAPEUTIC FROM PRODUCTS OF CONCEPTION, VIA NATURAL OR ARTIFICIAL OPENING: ICD-10-PCS | Performed by: OBSTETRICS & GYNECOLOGY

## 2024-08-19 PROCEDURE — 1220000000 HC SEMI PRIVATE OB R&B

## 2024-08-19 PROCEDURE — 83735 ASSAY OF MAGNESIUM: CPT

## 2024-08-19 PROCEDURE — 2500000003 HC RX 250 WO HCPCS: Performed by: NURSE ANESTHETIST, CERTIFIED REGISTERED

## 2024-08-19 RX ORDER — SIMETHICONE 80 MG
80 TABLET,CHEWABLE ORAL EVERY 6 HOURS PRN
Status: DISCONTINUED | OUTPATIENT
Start: 2024-08-19 | End: 2024-08-21 | Stop reason: HOSPADM

## 2024-08-19 RX ORDER — SODIUM CHLORIDE, SODIUM LACTATE, POTASSIUM CHLORIDE, CALCIUM CHLORIDE 600; 310; 30; 20 MG/100ML; MG/100ML; MG/100ML; MG/100ML
INJECTION, SOLUTION INTRAVENOUS CONTINUOUS
Status: DISCONTINUED | OUTPATIENT
Start: 2024-08-19 | End: 2024-08-21 | Stop reason: HOSPADM

## 2024-08-19 RX ORDER — ACETAMINOPHEN 500 MG
1000 TABLET ORAL EVERY 6 HOURS PRN
Qty: 120 TABLET | Refills: 1 | Status: SHIPPED | OUTPATIENT
Start: 2024-08-19

## 2024-08-19 RX ORDER — SODIUM CHLORIDE 0.9 % (FLUSH) 0.9 %
5-40 SYRINGE (ML) INJECTION PRN
Status: DISCONTINUED | OUTPATIENT
Start: 2024-08-19 | End: 2024-08-21 | Stop reason: HOSPADM

## 2024-08-19 RX ORDER — ONDANSETRON 4 MG/1
4 TABLET, ORALLY DISINTEGRATING ORAL EVERY 6 HOURS PRN
Status: DISCONTINUED | OUTPATIENT
Start: 2024-08-19 | End: 2024-08-21 | Stop reason: HOSPADM

## 2024-08-19 RX ORDER — FERROUS SULFATE 325(65) MG
325 TABLET, DELAYED RELEASE (ENTERIC COATED) ORAL
Status: DISCONTINUED | OUTPATIENT
Start: 2024-08-20 | End: 2024-08-21 | Stop reason: HOSPADM

## 2024-08-19 RX ORDER — ACETAMINOPHEN 500 MG
1000 TABLET ORAL EVERY 6 HOURS PRN
Status: DISCONTINUED | OUTPATIENT
Start: 2024-08-19 | End: 2024-08-21 | Stop reason: HOSPADM

## 2024-08-19 RX ORDER — SENNOSIDES A AND B 8.6 MG/1
1 TABLET, FILM COATED ORAL 2 TIMES DAILY
Qty: 60 TABLET | Refills: 1 | Status: SHIPPED | OUTPATIENT
Start: 2024-08-19 | End: 2025-08-19

## 2024-08-19 RX ORDER — IBUPROFEN 600 MG/1
600 TABLET, FILM COATED ORAL EVERY 6 HOURS PRN
Qty: 120 TABLET | Refills: 1 | Status: SHIPPED | OUTPATIENT
Start: 2024-08-19

## 2024-08-19 RX ORDER — INSULIN LISPRO 100 [IU]/ML
0-12 INJECTION, SOLUTION INTRAVENOUS; SUBCUTANEOUS 4 TIMES DAILY
Status: DISCONTINUED | OUTPATIENT
Start: 2024-08-19 | End: 2024-08-19

## 2024-08-19 RX ORDER — ROPIVACAINE HYDROCHLORIDE 2 MG/ML
INJECTION, SOLUTION EPIDURAL; INFILTRATION; PERINEURAL PRN
Status: DISCONTINUED | OUTPATIENT
Start: 2024-08-19 | End: 2024-08-19 | Stop reason: SDUPTHER

## 2024-08-19 RX ORDER — LIDOCAINE HYDROCHLORIDE AND EPINEPHRINE 15; 5 MG/ML; UG/ML
INJECTION, SOLUTION EPIDURAL PRN
Status: DISCONTINUED | OUTPATIENT
Start: 2024-08-19 | End: 2024-08-19 | Stop reason: SDUPTHER

## 2024-08-19 RX ORDER — GLUCAGON 1 MG/ML
1 KIT INJECTION PRN
Status: DISCONTINUED | OUTPATIENT
Start: 2024-08-19 | End: 2024-08-21 | Stop reason: HOSPADM

## 2024-08-19 RX ORDER — ONDANSETRON 2 MG/ML
4 INJECTION INTRAMUSCULAR; INTRAVENOUS EVERY 6 HOURS PRN
Status: DISCONTINUED | OUTPATIENT
Start: 2024-08-19 | End: 2024-08-21 | Stop reason: HOSPADM

## 2024-08-19 RX ORDER — SENNA AND DOCUSATE SODIUM 50; 8.6 MG/1; MG/1
2 TABLET, FILM COATED ORAL NIGHTLY
Status: DISCONTINUED | OUTPATIENT
Start: 2024-08-19 | End: 2024-08-21 | Stop reason: HOSPADM

## 2024-08-19 RX ORDER — NALOXONE HYDROCHLORIDE 0.4 MG/ML
INJECTION, SOLUTION INTRAMUSCULAR; INTRAVENOUS; SUBCUTANEOUS PRN
Status: DISCONTINUED | OUTPATIENT
Start: 2024-08-19 | End: 2024-08-19

## 2024-08-19 RX ORDER — LANOLIN 72 %
OINTMENT (GRAM) TOPICAL PRN
Status: DISCONTINUED | OUTPATIENT
Start: 2024-08-19 | End: 2024-08-21 | Stop reason: HOSPADM

## 2024-08-19 RX ORDER — SODIUM CHLORIDE 0.9 % (FLUSH) 0.9 %
5-40 SYRINGE (ML) INJECTION EVERY 12 HOURS SCHEDULED
Status: DISCONTINUED | OUTPATIENT
Start: 2024-08-19 | End: 2024-08-21 | Stop reason: HOSPADM

## 2024-08-19 RX ORDER — DEXTROSE MONOHYDRATE 100 MG/ML
INJECTION, SOLUTION INTRAVENOUS CONTINUOUS PRN
Status: DISCONTINUED | OUTPATIENT
Start: 2024-08-19 | End: 2024-08-19

## 2024-08-19 RX ORDER — SODIUM CHLORIDE 9 MG/ML
INJECTION, SOLUTION INTRAVENOUS PRN
Status: DISCONTINUED | OUTPATIENT
Start: 2024-08-19 | End: 2024-08-21 | Stop reason: HOSPADM

## 2024-08-19 RX ORDER — BISACODYL 10 MG
10 SUPPOSITORY, RECTAL RECTAL DAILY PRN
Status: DISCONTINUED | OUTPATIENT
Start: 2024-08-19 | End: 2024-08-21 | Stop reason: HOSPADM

## 2024-08-19 RX ORDER — LIDOCAINE HYDROCHLORIDE 10 MG/ML
INJECTION, SOLUTION INFILTRATION; PERINEURAL
Status: DISCONTINUED
Start: 2024-08-19 | End: 2024-08-19

## 2024-08-19 RX ORDER — INSULIN LISPRO 100 [IU]/ML
0-12 INJECTION, SOLUTION INTRAVENOUS; SUBCUTANEOUS PRN
Status: DISCONTINUED | OUTPATIENT
Start: 2024-08-19 | End: 2024-08-21 | Stop reason: HOSPADM

## 2024-08-19 RX ORDER — IBUPROFEN 600 MG/1
600 TABLET, FILM COATED ORAL EVERY 6 HOURS PRN
Status: DISCONTINUED | OUTPATIENT
Start: 2024-08-19 | End: 2024-08-21 | Stop reason: HOSPADM

## 2024-08-19 RX ADMIN — Medication 10 ML/HR: at 11:41

## 2024-08-19 RX ADMIN — SODIUM CHLORIDE, POTASSIUM CHLORIDE, SODIUM LACTATE AND CALCIUM CHLORIDE: 600; 310; 30; 20 INJECTION, SOLUTION INTRAVENOUS at 19:22

## 2024-08-19 RX ADMIN — Medication 1 MILLI-UNITS/MIN: at 11:47

## 2024-08-19 RX ADMIN — SENNOSIDES AND DOCUSATE SODIUM 2 TABLET: 50; 8.6 TABLET ORAL at 22:19

## 2024-08-19 RX ADMIN — Medication 5 ML: at 11:35

## 2024-08-19 RX ADMIN — Medication 25 MCG: at 04:30

## 2024-08-19 RX ADMIN — Medication 5 ML: at 11:30

## 2024-08-19 RX ADMIN — MAGNESIUM SULFATE HEPTAHYDRATE 2000 MG/HR: 40 INJECTION, SOLUTION INTRAVENOUS at 01:04

## 2024-08-19 RX ADMIN — ACETAMINOPHEN 1000 MG: 500 TABLET ORAL at 20:53

## 2024-08-19 RX ADMIN — MAGNESIUM SULFATE HEPTAHYDRATE 2000 MG/HR: 40 INJECTION, SOLUTION INTRAVENOUS at 08:42

## 2024-08-19 RX ADMIN — ACETAMINOPHEN 1000 MG: 500 TABLET ORAL at 07:36

## 2024-08-19 RX ADMIN — LABETALOL HYDROCHLORIDE 400 MG: 200 TABLET, FILM COATED ORAL at 22:19

## 2024-08-19 RX ADMIN — Medication 25 MCG: at 00:35

## 2024-08-19 RX ADMIN — IBUPROFEN 600 MG: 600 TABLET, FILM COATED ORAL at 20:53

## 2024-08-19 RX ADMIN — SODIUM CHLORIDE, POTASSIUM CHLORIDE, SODIUM LACTATE AND CALCIUM CHLORIDE: 600; 310; 30; 20 INJECTION, SOLUTION INTRAVENOUS at 00:26

## 2024-08-19 RX ADMIN — LABETALOL HYDROCHLORIDE 400 MG: 200 TABLET, FILM COATED ORAL at 07:33

## 2024-08-19 RX ADMIN — LIDOCAINE HYDROCHLORIDE,EPINEPHRINE BITARTRATE 3 ML: 15; .005 INJECTION, SOLUTION EPIDURAL; INFILTRATION; INTRACAUDAL; PERINEURAL at 11:29

## 2024-08-19 RX ADMIN — SODIUM CHLORIDE, PRESERVATIVE FREE 10 ML: 5 INJECTION INTRAVENOUS at 00:16

## 2024-08-19 RX ADMIN — LABETALOL HYDROCHLORIDE 400 MG: 200 TABLET, FILM COATED ORAL at 16:44

## 2024-08-19 RX ADMIN — MAGNESIUM SULFATE HEPTAHYDRATE 4000 MG: 40 INJECTION, SOLUTION INTRAVENOUS at 00:27

## 2024-08-19 ASSESSMENT — PAIN SCALES - GENERAL
PAINLEVEL_OUTOF10: 0
PAINLEVEL_OUTOF10: 3
PAINLEVEL_OUTOF10: 4
PAINLEVEL_OUTOF10: 1

## 2024-08-19 ASSESSMENT — PAIN DESCRIPTION - LOCATION
LOCATION: ABDOMEN;BACK
LOCATION: HEAD

## 2024-08-19 NOTE — PROGRESS NOTES
Obstetric/Gynecology Interval Note    Late Entry from approximately 2000    Discussed with the patient and her partner that due to ongoing visual disturbances she meets criteria for chronic hypertension with superimposed preeclampsia (former diagnosis) now with severe features. Discussed admission with mag sulfate and starting induction. The patient and her partner are agreeable. Fetal tracing category one. Maternal blood pressures have been elevated non-severe. Home Labetalol of 300 mg TID increased to 400 mg TID. GDMA1 >34 weeks will not initiate celestone.    Will move patient to a room when nursing staff is available. All questions answered.     DO Mary Gamble  [address]    [id number]  [facility], [address]  8/19/2024, 12:05 AM

## 2024-08-19 NOTE — PROGRESS NOTES
Labor Progress Note    Renate Mcbride is a 28 y.o. female  at 36w0d  The patient was seen and examined. Her pain is well controlled. She reports fetal movement is present, complains of contractions, complains of loss of fluid, denies vaginal bleeding.       Vital Signs:  Vitals:    24 1525 24 1530 24 1535 24 1540   BP:  128/81     Pulse:  88     Resp:  16     Temp:       TempSrc:       SpO2: 98% 98% 98% 99%        FHT: 130, moderate variability, accelerations present, decelerations absent  Contractions: regular, every 4 minutes    Chaperone for Intimate Exam: Chaperone was present for entire exam, Chaperone Name: Megan  Cervical Exam: 6 cm dilated, 70 effaced, -1 station  Pitocin: @ 10 mu/min    Membranes: Ruptured clear fluid  Scalp Electrode in place: absent  Intrauterine Pressure Catheter in Place: present    Interventions: SVE, IUPC replaced    Assessment/Plan:  Renate Mcbride is a 28 y.o. female  at 36w0d admitted for IOL 2/2 cHTN (meds) w/GERMÁN w/SF (VC)              - GBS negative, No indication for GBS prophylaxis              - VSS              - IVF:  mL/hr  - cEFM/IUPC: CAT I, regular contractions every 4 min              - S/p Cytotec 25 mg BU x2              - AROM (clear)  - SVE: /-1              - Epidural in place   - IUPC replaced because it was displaced and not registering contractions on TOCO              - Continue Pitocin per protocol   - Continue mag sulfate until 24hrs PP   - Continue Labetalol 400mg TID   - PreE labs wnl, P/C 0.61              - Will continue to monitor closely    GDMA1   - POCT glucoe q2hr   - OB ISS in place    Attending updated and in agreement with plan    Kamini Ivan,   Ob/Gyn Resident  2024, 4:30 PM    Resident Physician Attestation  I discussed the findings and plans with the resident physician and agree as documented in her note     Michaela Nation,   OB/GYN Resident PGY4  [facility]

## 2024-08-19 NOTE — DISCHARGE SUMMARY
Obstetric Discharge Summary  [facility]    Patient Name: Renate Mcbride  Patient : 1995  Primary Care Physician: No primary care provider on file.  Admit Date: 2024    Principal Diagnosis: IUP at 35w6d, admitted for Induction of Labor 2/2 cHTN (meds) with GERMÁN with SF (VC)     Her pregnancy has been complicated by:   Patient Active Problem List   Diagnosis    cHTN (meds) w/ GERMÁN w/ SF    Hx gHTN (G1)    FHx VTE    GDMA1 (G4)    Elevated AST (SGOT)    35 weeks gestation of pregnancy    Severe pre-eclampsia in third trimester    Chronic hypertension affecting pregnancy     24 M Apt  Wt 7#0       Infection Present?: No  Hospital Acquired: N/A    Surgical Operations & Procedures:  Analgesia: epidural  Delivery Type: Spontaneous Vaginal Delivery: See Labor and Delivery Summary   Laceration(s): First degree laceration.  Suture used for repair:  Vicryl 2.0.    Consultations: Anesthesia    Pertinent Findings & Procedures:   Renate Mcbride is a 28 y.o. female  at 35w6d admitted for Induction of Labor 2/2 cHTN (meds) with GERMÁN with SF (VC) ; received Mag sulfate 4g>2g, Cytotec 25 mcg BU x2, Pitocin, Epidural, AROM (clr), IUPC.     She delivered by spontaneous vaginal a Live Born infant on 24.       Information for the patient's :  Steve Mcbride [[id number]]   male   Birth Weight: 3.185 kg (7 lb 0.4 oz)    Apgars: 8 at 1 minute and 9 at 5 minutes.     Postpartum course: normal  PPD#1: H&H 10.3/31.7  PPD#2: S/p Mag x 24hr, Rhogam indicated and given. Stable for DC.    Course of patient: complicated by PreE w/ SF    Discharge to: Home    Readmission planned: no     Indication for 6 week PP 2 hour GTT?: no    Eligible for 2 week PP virtual visit? no - private patient       Recommendations on Discharge:     Medications:      Medication List        START taking these medications      acetaminophen 500 MG tablet  Commonly known as: TYLENOL  Take 2 tablets by mouth every

## 2024-08-19 NOTE — PROGRESS NOTES
Date: 2024         Time: 9:33 AM    Renate Mcbride is a 28 y.o. female at 36w0d  The patient was seen and examined.   Her pain is well controlled.  Discussed plan of care.  She is doing well without CP/SOB/F/CH/N/V/HA.        Blood pressure 126/78, pulse 81, temperature 98.4 °F (36.9 °C), temperature source Oral, resp. rate 16, last menstrual period 2023, SpO2 97%, not currently breastfeeding.    FHT: Category 1 FHT  Accels: present  Decels: absent  Contractions: irregular  Cervical Exam: 2 cm dilated, 50 effaced, -1 station, cervical position mid, consistency soft  Pitocin    Membranes Are: Intact  Scalp Electrode in place: No  Intrauterine Pressure Catheter in Place: No    Assessment/Plan:  1. Renate Mcbride is a 28 y.o. female  36w0d   2. GBS neg   -no indication for GBS prophylaxis  3. Recommend epidural when requested and AROM when able  4. Continue management of IOL for cHTN with superimposed PreE w/ SF   - Magnesium 2g/hr   - Vitals per protocol   - asymptomatic at this time   - Repeat labs q8 hours or sooner if clinically indicated   - continue Labetalol PO      Ty Argueta,

## 2024-08-19 NOTE — PROGRESS NOTES
Labor Progress Note  Resident Interval Magnesium Note    Renate Mcbride is a 28 y.o. female  at 36w0d  The patient was seen and examined. Her pain is well controlled. She reports fetal movement is present, complains of contractions, denies loss of fluid, denies vaginal bleeding.       She admits to visual changes but improved since admission. She denies headaches, shortness of breath or chest pain. She denies change in her extremities, regarding swelling.    Continuous Medications:    dextrose      oxytocin      sodium chloride      magnesium sulfate 2,000 mg/hr (24 0842)    lactated ringers IV soln 75 mL/hr at 24 0026         Vital Signs:  Vitals:    24 0810 24 0815 24 0820 24 0913   BP:    126/78   Pulse:    81   Resp:    16   Temp:       TempSrc:       SpO2: 96% 95% 96% 97%          Physical Exam:  FHT:  135 , moderate variability, accelerations present, decelerations absent  Contractions: intermittent contractions, uterine irritibility    Chaperone for Intimate Exam: Chaperone was present for entire exam, Chaperone Name: Megan  Cervical Exam: 3 cm dilated, 50 effaced, -1 station     Chest: clear to auscultation bilaterally  Heart: RRR no murmur  Abdomen: soft, nontender, gravid, no s/s chorio or abruption  Extremities: DTR normal Right: 2/4   Left: 2/4  Clonus: absent    Urine Output: 150/hr in 2; Clear urine      Pitocin: @ 0 mu/min    Membranes: Intact  Scalp Electrode in place: absent  Intrauterine Pressure Catheter in Place: absent    Interventions: SVE    BMP:    Recent Labs     24  1631      K 3.9      CO2 19*   BUN 10   CREATININE 0.5   GLUCOSE 106*       Assessment/Plan:  Renate Mcbride is a 28 y.o. female  at 36w0d admitted for IOL 2/2 cHTN (meds) w/GERMÁN w/SF (VC)    - GBS negative, No indication for GBS prophylaxis              - cEFM/TOCO: CAT I, intermittent contractions              - IVF:  mL/hr              - S/p Cytotec 25 mcg

## 2024-08-19 NOTE — PROGRESS NOTES
Labor Progress Note    Renate Mcbride is a 28 y.o. female  at 36w0d  The patient was seen and examined. Her pain is well controlled. She reports fetal movement is present, denies contractions, denies loss of fluid, denies vaginal bleeding.       Vital Signs:  Vitals:    24 0330 24 0400 24 0500 24 0600   BP: 133/83 116/72 126/82 (!) 132/92   Pulse: 80 83 83 86   Resp:       Temp:       TempSrc:       SpO2: 95% 96% 96% 95%       FHT: 130, moderate variability, accelerations present, decelerations absent  Contractions: uterine irritability     Chaperone for Intimate Exam: Chaperone was present for entire exam, Chaperone Name: Darling FORRESTER   Cervical Exam: 1 cm dilated, 30 effaced, -2 station  Pitocin: @ 0 mu/min    Membranes: Intact  Scalp Electrode in place: absent  Intrauterine Pressure Catheter in Place: absent    Interventions: SVE    Assessment/Plan:  Renate Mcbride is a 28 y.o. female  at 36w0d admitted for IOL 2/2 cHTN (meds) w/GERMÁN w/SF (VC)   - GBS negative    - No indication for GBS prophylaxis   - VSS, afebrile   - BPs normotensive and elevated non severe     - cEFM and TOCO    - Cat 1 FHT and TOCO showing no contractions   - S/p Cytotec 25 mcg BU x2   - SVE; /2   - PreE labs wnl, P/C 0.61   - Denies s/sx of PreE   - Labetalol 300 TID > 400 TID    - Will continue to monitor closely.     Attending updated and in agreement with plan    Kiera Hartman MD  Ob/Gyn Resident  2024, 6:28 AM

## 2024-08-19 NOTE — PROGRESS NOTES
Labor Progress Note    Renate Mcbride is a 28 y.o. female  at 36w0d  The patient was seen and examined. Her pain is well controlled. She reports fetal movement is present, complains of contractions, denies loss of fluid, denies vaginal bleeding.       Vital Signs:  Vitals:    24 0810 24 0815 24 0820 24 0913   BP:    126/78   Pulse:    81   Resp:    16   Temp:       TempSrc:       SpO2: 96% 95% 96% 97%        FHT:  135 , moderate variability, accelerations present, decelerations absent  Contractions: intermittent contractions, uterine irritibility    Chaperone for Intimate Exam: Chaperone was present for entire exam, Chaperone Name: Megan  Cervical Exam: 3 cm dilated, 50 effaced, -1 station  Pitocin: @ 0 mu/min    Membranes: Intact  Scalp Electrode in place: absent  Intrauterine Pressure Catheter in Place: absent    Interventions: SVE    Assessment/Plan:  Renate Mcbride is a 28 y.o. female  at 36w0d admitted for IOL 2/2 cHTN (meds) w/GERMÁN w/SF (VC)   - GBS negative, No indication for GBS prophylaxis   - cEFM/TOCO: CAT I, intermittent contractions   - IVF:  mL/hr   - S/p Cytotec 25 mcg BU x2  - SVE: 3/50/-1  - She is tolerating her contractions, but desires an epidural later for pain control   - Start Pitocin per protocol   - Will continue to monitor closely    cHTN (meds) w/GERMÁN w/SF (VC)  - VSS, last BPs were normotensive   - Denies s/sx of PreE other than vision changes but improved since admission  - PreE labs wnl, P/C 0.61  - BP controlled on Labetalol 400 TID   - Mag 4g > 2g until delivery  - q4h Mag checks, next @ 1245    Attending updated and in agreement with plan    Kamini Ivan DO  Ob/Gyn Resident  2024, 9:26 AM

## 2024-08-19 NOTE — PROGRESS NOTES
Obstetric/Gynecology Resident Interval Note    Patient seen and reevaluated. She reports resolution of headache but persistence of vision changes. Due to vision changes, patient meets criteria for IOL 2/2 cHTN (meds) with GERMÁN with SF. Mag sulfate 4g>2g ordered. Admission labs ordered. Will check and scan patient once in a labor room.     Dr. Francisco updated and in agreement with plan.     Trish Way DO  OB/GYN Resident, PGY4  [place], [place]  8/18/2024, 9:24 PM

## 2024-08-19 NOTE — PROGRESS NOTES
Labor Progress Note  Resident Interval Magnesium Note    Renate Mcbride is a 28 y.o. female  at 36w0d  The patient was seen and examined. The patient is resting comfortably. Her pain is well controlled with a recently placed epidural. She reports fetal movement is present, complains of contractions, denies loss of fluid, denies vaginal bleeding.       She denies headache, visual changes, and abdominal pain in the right upper quadrant. She denies any shortness of breath or chest pain. She denies change in her extremities, regarding swelling.    Continuous Medications:    dextrose      oxytocin 2 diony-units/min (24 1219)    ROPivacaine 0.2% in sodium chloride 0.9% (OB)      sodium chloride      magnesium sulfate 2,000 mg/hr (24 1205)    lactated ringers IV soln 999 mL/hr at 24 1205         Vital Signs:  Vitals:    24 1220 24 1221 24 1225 24 1230   BP:  (!) 101/54     Pulse:  81     Resp:  16     Temp:       TempSrc:       SpO2: 97% 97% 97% 98%         Physical Exam:  FHT: 140, moderate variability, accelerations present, decelerations absent  Contractions: regular, every 2-3 minutes    Chaperone for Intimate Exam: Chaperone was present for entire exam, Chaperone Name: Tracy FORRESTER  Cervical Exam: 3 cm dilated, 70 effaced, -1 station    Chest: clear to auscultation bilaterally  Heart: RRR no murmur  Abdomen: soft, nontender, gravid, no s/s chorio or abruption  Extremities: DTR normal Right: 1+/4   Left: 1+/4    Urine Output: 133 ml/hr; Clear urine      Pitocin: @ 2 mu/min    Membranes: Ruptured clear fluid, AROM  Scalp Electrode in place: absent  Intrauterine Pressure Catheter in Place: present    Interventions: SVE, AROM (clr), IUPC placed, patient tolerated well    Labs:  Last Magnesium Level:   No results found for: \"MG\"    BMP:    Recent Labs     24  1631      K 3.9      CO2 19*   BUN 10   CREATININE 0.5   GLUCOSE 106*       Assessment/Plan:  Renate

## 2024-08-19 NOTE — FLOWSHEET NOTE
Everyone in room is wearing a mask.  11:17 am - Jm, CRNA / Student Rjei at bedside and consent form signed.  Risks and benefits discussed and patient verbalizes understanding.  Patient verbalizes to proceed with procedure.   Time out performed.   11:19 am - to dangle position.  11:22 am  - procedure started.   11:28 am Cath. Placement. 11:29 am - test dose given.   Patient tolerated procedure well. 11:36 am -to low fowlers position with left hip wedge in place (additional dose given). 11:38 am - epidural pump started.  See anesthesia note.

## 2024-08-19 NOTE — PROGRESS NOTES
Labor Progress Note    Renate Mcbride is a 28 y.o. female  at 36w0d  The patient was seen and examined. Her pain is well controlled with an epidural in place. She reports fetal movement is present, complains of contractions, complains of loss of fluid, denies vaginal bleeding.       Vital Signs:  Vitals:    24 1350 24 1355 24 1400 24 1401   BP:    123/77   Pulse:    82   Resp:    16   Temp:    98.2 °F (36.8 °C)   TempSrc:    Oral   SpO2: 96% 97% 96% 97%       FHT: 130, moderate variability, accelerations present, decelerations absent  Contractions: regular, every 3-4 minutes    Chaperone for Intimate Exam: Chaperone was present for entire exam, Chaperone Name: RN  Cervical Exam: 4 cm dilated, 50 effaced, -1 station  Pitocin: @ 6 mu/min    Membranes: Ruptured clear fluid  Scalp Electrode in place: absent  Intrauterine Pressure Catheter in Place: present    Interventions: SVE    Assessment/Plan:  Renate Mcbride is a 28 y.o. female  at 36w0d admitted for IOL 2/2 cHTN w/ GERMÁN w/ SF   - GBS negative, No indication for GBS prophylaxis   - VSS, afebrile   - cEFM/TOCO cat 1, regular contractions   - SVE: /-1  - Epidural in place   - AROM (clr)   - Continue pitocin per protocol   - Continue to monitor closely    Attending updated and in agreement with plan    Bhumika Rm MD  Ob/Gyn Resident  2024, 1:44 PM

## 2024-08-19 NOTE — ANESTHESIA PRE PROCEDURE
Department of Anesthesiology  Preprocedure Note       Name:  Renate Mcbride   Age:  28 y.o.  :  1995                                          MRN:  [id number]         Date:  2024      Surgeon: * No surgeons listed *    Procedure: * No procedures listed *    Medications prior to admission:   Prior to Admission medications    Medication Sig Start Date End Date Taking? Authorizing Provider   labetalol (NORMODYNE) 100 MG tablet Take 3 tablets by mouth in the morning, at noon, and at bedtime 24  Yes Brenda Swenson DO   labetalol (NORMODYNE) 200 MG tablet Take 1 tablet by mouth every 12 hours 24  Yes Lillian Whyte MD   aspirin 81 MG EC tablet Take 1 tablet by mouth daily 2/15/24  Yes Irasema Wu APRN - CNM   blood glucose test strips (FREESTYLE LITE) strip USE TO TEST BLOOD SUGARS FOUR TIMES A DAY AND AS NEEDED FOR SYMPTOMS OF IRREGULAR BLOOD GLUCOSE 7/15/24   April Valentine APRN - CNP   glucose monitoring kit Use glucose kit for testing sugars 4 times as a day and as needed if necessary. 24   April Valentine APRN - CNP   Lancets MISC 1 each by Does not apply route daily 24   April Valentine APRN - CNP   ondansetron (ZOFRAN) 4 MG tablet Take 1 tablet by mouth daily as needed for Nausea or Vomiting  Patient not taking: Reported on 2024 3/15/24   Irasema Wu APRN - CNM   ondansetron (ZOFRAN-ODT) 4 MG disintegrating tablet Take 1 tablet by mouth 3 times daily as needed for Nausea or Vomiting 23   Irasema Tohrnton DO   Prenatal Vit w/Qa-[id number] (PNV PO) Take by mouth    Provider, MD Aníbal       Current medications:    Current Facility-Administered Medications   Medication Dose Route Frequency Provider Last Rate Last Admin    glucose chewable tablet 16 g  4 tablet Oral PRN Trish Way DO        dextrose bolus 10% 125 mL  125 mL IntraVENous PRN Trish Way DO        Or    dextrose bolus 10% 250 mL  250 mL IntraVENous PRN

## 2024-08-19 NOTE — PROGRESS NOTES
Labor Progress Note  Resident Interval Magnesium Note    Renate Mcbride is a 28 y.o. female  at 36w0d  The patient was seen and examined. The patient is resting comfortably. Her pain is well controlled. She reports fetal movement is present, complains of contractions, complains of loss of fluid, denies vaginal bleeding.       She states her vision changes have improved. denies headache. She denies any shortness of breath or chest pain. She denies change in her extremities, regarding swelling.    Continuous Medications:    dextrose      oxytocin 12 diony-units/min (24 1630)    ROPivacaine 0.2% in sodium chloride 0.9% (OB) 10 mL/hr (24 1129)    sodium chloride      magnesium sulfate 1,000 mg/hr (24 1609)    lactated ringers IV soln 999 mL/hr at 24 1205         Vital Signs:  Vitals:    24 1530 24 1535 24 1540 24 1630   BP: 128/81   106/63   Pulse: 88   98   Resp: 16   16   Temp:       TempSrc:       SpO2: 98% 98% 99% 97%          Physical Exam:  FHT: 130, moderate variability, accelerations present, decelerations absent  Contractions: regular, every 3-4 minutes    Chaperone for Intimate Exam: Chaperone was present for entire exam, Chaperone Name: Megan  Cervical Exam: 8 cm dilated, 90 effaced, 0 station    Chest: clear to auscultation bilaterally  Heart: RRR no murmur  Abdomen: soft, nontender, gravid, no s/s chorio or abruption  Extremities: DTR normal Upper Right: 2/4 Left: 2/4, DTR absent lower   Clonus: absent    Urine Output: 133/hr; Clear urine      Pitocin: @ 14 mu/min    Membranes: Ruptured clear fluid  Scalp Electrode in place: absent  Intrauterine Pressure Catheter in Place: present    Interventions: SVE    Labs:  Last Magnesium Level:   Lab Results   Component Value Date/Time    MG 5.1 2024 10:29 AM       BMP:    Recent Labs     24  1631      K 3.9      CO2 19*   BUN 10   CREATININE 0.5   GLUCOSE 106*       Assessment/Plan:  Renate  Rae is a 28 y.o. female  at 36w0d IOL 2/2 cHTN (meds) w/GERMÁN w/SF (VC)    - GBS negative, No indication for GBS prophylaxis              - cEFM/IUPC: CAT I, intermittent contractions              - IVF:  mL/hr              - S/p Cytotec 25 mcg BU x2  - SVE: /0  - Epidural in place  - She is breathing through her contractions  - Continue Pitocin per protocol              - Will continue to monitor closely     cHTN (meds) w/GERMÁN w/SF (VC)  - VSS, last BPs were normotensive              - Denies s/sx of PreE other than vision changes but improved since admission  - PreE labs wnl, P/C 0.61  - UOP adequate  - BP controlled on Labetalol 400 TID   - Magnesium Sulfate Treatment 4g > 2g > 1g/hr due to decreased LE reflexes  - Mg 5.7, therapeutic range  - Continue magnesium until 24hrs post-partum    GDMA1   - CLD  - OB ISS   - POCT q2hrs    Kamini Ivan DO  Ob/Gyn Resident  2024, 4:35 PM      Resident Physician Attestation   I discussed the findings and plans with the resident physician and agree as documented in her note     Michaela Nation DO  OB/GYN Resident PGY4  [facility]

## 2024-08-19 NOTE — PROGRESS NOTES
Labor Progress Note  Resident Interval Magnesium Note    Renate Mcbride is a 28 y.o. female  at 36w0d  The patient was seen and examined. The patient is resting comfortably. Her pain is well controlled. She reports fetal movement is present, denies contractions, denies loss of fluid, denies vaginal bleeding.       She denies headache and abdominal pain in the right upper quadrant. She reports improvement in visual changes. She denies any shortness of breath or chest pain. She denies change in her extremities, regarding swelling.    Continuous Medications:    sodium chloride      magnesium sulfate 2,000 mg/hr (24 0104)    lactated ringers IV soln 75 mL/hr at 24 0026         Vital Signs:  Vitals:    24 0231 24 0300 24 0330 24 0400   BP: 128/76 114/67 133/83 116/72   Pulse: 79 79 80 83   Resp:       Temp:       TempSrc:       SpO2:  95% 95% 96%         Physical Exam:  FHT:  130 , moderate variability, accelerations present, decelerations absent  Contractions: uterine irritability    Cervical Exam: deferred    Chest: clear to auscultation bilaterally  Heart: RRR no murmur  Abdomen: soft, nontender, gravid, no s/s chorio or abruption  Extremities: DTR normal Right: +2/4   Left: +2/4  Clonus: absent    Urine Output: 400 mL over 1.75hrs. 228mL/hr; Clear urine      Pitocin: @ 0 mu/min    Membranes: Intact  Scalp Electrode in place: absent  Intrauterine Pressure Catheter in Place: absent    Interventions: none    Labs:  Last Magnesium Level:   No results found for: \"MG\"    BMP:    Recent Labs     24  1631      K 3.9      CO2 19*   BUN 10   CREATININE 0.5   GLUCOSE 106*       Assessment/Plan:  Renate Mcbride is a 28 y.o. female  at 36w0d IUP admitted for IOL 2/2 cHTN (meds) w/GERMÁN w/SF (VC)   - GBS negative   - No indication for GBS prophylaxis   - Continue Magnesium Sulfate Treatment 2g/hr, off @ 24hrs PP   - BPs normotensive and elevated non severe   - Patient

## 2024-08-19 NOTE — L&D DELIVERY NOTE
Vaginal Delivery Note  Department of Obstetrics and Gynecology  [facility]       Patient: Renate Mcbride   : 1995  MRN: [id number]   Date of delivery: 24     Pre-operative Diagnosis: Renate Mcbride  at 36w0d  Induction of labor secondary to chronic hypertension (on meds) with superimposed preeclampsia with severe features (vision changes)  Headache (resolved)   Gestational diabetes mellitus type 1   Family history of venous thromboembolism   BMI 32     Post-operative Diagnosis:    [place] living infant male  Induction of labor secondary to chronic hypertension (on meds) with superimposed preeclampsia with severe features (vision changes)  Headache (resolved)   Gestational diabetes mellitus type 1   Family history of venous thromboembolism   BMI 32    Delivering Obstetrician & Assistant(s): Dr. Argueta, Trish Way DO, PGY4; Kiera Hartman MD, PGY1    Infant Information:   Information for the patient's :  Steve Mcbride [[id number]]      Information for the patient's :  Steve Mcbride [[id number]]        Apgar scores: 8 at 1 minute and 9 at 5 minutes.     Anesthesia:  epidural anesthesia    Application and Delivery:    She was known to be GBS negative and no indication for antibiotic prophylaxis.     The patient progressed well, received an epidural, became complete and felt the urge to push. After pushing with contractions the fetal head delivered Cephalic, left occiput anterior over an intact perineum, nuchal cord was not present. The anterior, then posterior shoulder delivered easily and atraumatically followed by the rest of the infant. Nose and mouth suctioned with bulb suction, infant was stimulated and dried. Cord was clamped and cut after one minute delayed cord clamping and infant was placed on maternal abdomen, and attended by RN for evaluation. The delivery of the placenta was spontaneous and appeared intact, whole, and that the umbilical cord had

## 2024-08-19 NOTE — CARE COORDINATION
ANTEPARTUM NOTE    35 weeks gestation of pregnancy [Z3A.35]    Renate was admitted to L&D on 8/19/24 for elevated BPs and blurry vision @ 35w6d and was found to have Pre E w/ SF needing IV Magnesium and IOL    OB GYN Provider: Dr. Argueta    Will meet with patient after delivery to verify name/address/phone/insurance and discuss discharge planning.     Anticipate DC home 2 nights after vaginal delivery or 4 nights after C/S delivery as long as hemodynamically stable.

## 2024-08-19 NOTE — FLOWSHEET NOTE
Late entry due to hands on care    Pt to be admitted for IOL per orders. Care handed off for coverage to CN for this RN to be present in separate delivery.

## 2024-08-19 NOTE — PROGRESS NOTES
Labor Progress Note    Renate Mcbride is a 28 y.o. female  at 36w0d  The patient was seen and examined. Her pain is well controlled. She reports fetal movement is present, denies contractions, denies loss of fluid, denies vaginal bleeding.      She admits to vision changes, denies headache and abdominal pain in the right upper quadrant. She denies any shortness of breath or chest pain. She denies change in her extremities, regarding swelling     Vital Signs:  Vitals:    24 2032 24 2101 24 2231 24 2301   BP: (!) 140/89 136/83 131/77 131/75   Pulse: 77 78 88 88   Resp:    16   Temp:    98.3 °F (36.8 °C)   TempSrc:    Oral   SpO2:    98%       FHT:  135 , moderate variability, accelerations present, decelerations absent  Contractions: none    Chaperone for Intimate Exam: Chaperone was present for entire exam, Chaperone Name: Darling FORRESTER   Cervical Exam: 1 cm dilated, 30 effaced, -2 station  Pitocin: @ 0 mu/min    Membranes: Intact  Scalp Electrode in place: absent  Intrauterine Pressure Catheter in Place: absent    Interventions: SVE     Assessment/Plan:  Renate Mcbride is a 28 y.o. female  at 36w0d IUP    - GBS negative   - No indication for GBS prophylaxis    IOL 2/2 cHTN (meds) w/GERMÁN w/SF (VC)   - Admit to labor and delivery under the service of Dr Francisco   - VSS, afebrile    - BPs normotensive and elevated non severe    - cEFM and TOCO   - Cat 1 FHT and TOCO showing no contractions    - SVE: 1 cm/30%/-2 station/   - BSUS: cephalic presentation, anterior placenta, EFW 6#7   - T&S, T.Pal ordered   - UDS ordered. R/B/A discussed with patient and patient agreeable   - LR IVF @ 125 mL/hr   - Plan of Induction: Cytotec 25mg BU     - Start magnesium Sulfate Treatment 4g>2g/hr, off @ 24hrs PP    - Patient admits to vision changes but denies other s/s PreE   - Labetalol 300 TID>400 TID    - PreE labs wnl, PC 0.61   - Will continue to monitor closely with mag checks every 4hrs    GDMA1   -  Elevated 1hr GTT, and 3hr GTT   - Followed with MFM for blood glucose monitoring    Fhx VTE   - Patients mother with multiple blood clots    - Patient followed with MFM and MFM recommended evaluation for acquired/inherited thrombophilias but not done    BMI 32      Attending updated and in agreement with plan    Kiera Hartman MD  Ob/Gyn Resident  8/19/2024, 12:50 AM

## 2024-08-19 NOTE — ANESTHESIA PROCEDURE NOTES
Epidural Block    Patient location during procedure: OB  Start time: 8/19/2024 11:21 AM  End time: 8/19/2024 11:36 AM  Reason for block: labor epidural  Staffing  Resident/CRNA: Jae Guo APRN - CRNA  Other anesthesia staff: Reji Jacobs RN  Performed by: Jae Guo APRN - CRNA  Authorized by: Manpreet Diaz MD    Epidural  Patient position: sitting  Prep: Betadine  Patient monitoring: continuous pulse ox and frequent blood pressure checks  Approach: midline  Location: L3-4  Injection technique: NEAL air  Guidance: paresthesia technique  Provider prep: mask and sterile gloves  Needle  Needle type: Tuohy   Needle gauge: 17 G  Needle length: 3.5 in  Needle insertion depth: 5 cm  Catheter type: multi-orifice  Catheter size: 19 G  Catheter at skin depth: 11 cm  Test dose: negativeCatheter Secured: tegaderm and tape  Assessment  Sensory level: T6  Hemodynamics: stable  Attempts: 1  Outcomes: uncomplicated and patient tolerated procedure well  Preanesthetic Checklist  Completed: patient identified, IV checked, risks and benefits discussed, equipment checked, pre-op evaluation, timeout performed, anesthesia consent given, oxygen available and monitors applied/VS acknowledged

## 2024-08-19 NOTE — PROGRESS NOTES
Labor Progress Note    Renate Mcbride is a 28 y.o. female  at 36w0d  The patient was seen and examined. Her pain is well controlled. She reports fetal movement is present, complains of contractions, complains of loss of fluid, denies vaginal bleeding.       Vital Signs:  Vitals:    24 1430 24 1431 24 1435 24 1440   BP:  139/62     Pulse:  82     Resp:  16     Temp:       TempSrc:       SpO2: 96% 97% 96% 96%        FHT: 120, moderate variability, accelerations present, decelerations absent  Contractions: regular, every 4-5 minutes    Chaperone for Intimate Exam: Chaperone was present for entire exam, Chaperone Name: Megan   Cervical Exam: 5 cm dilated, 70 effaced, -1 station  Pitocin: @ 8 mu/min    Membranes: Ruptured clear fluid  Scalp Electrode in place: absent  Intrauterine Pressure Catheter in Place: present    Interventions: SVE     Assessment/Plan:  Renate Mcbride is a 28 y.o. female  at 36w0d admitted for IOL 2/2 cHTN (meds) w/GERMÁN w/SF (VC)   - GBS negative, No indication for GBS prophylaxis   - VSS   - IVF:  mL/hr  - cEFM/TOCO: CAT I, regular contractions every 4-5 min   - S/p Cytotec 25 mg BU x2   - AROM (clear)  - SVE: /-1   - Epidural in place   - Continue Pitocin per protocol    - Continue mag sulfate until 24hrs PP              - Continue Labetalol 400mg TID              - PreE labs wnl, P/C 0.61   - Will continue to monitor closely    GDMA1   - POCT glucose q2hr   - OB ISS in place    Attending updated and in agreement with plan    Kamini Ivan DO  Ob/Gyn Resident  2024, 3:13 PM     Resident Physician Attestation     I discussed the findings and plans with the resident physician and agree as documented in her note     Michaela Nation DO  OB/GYN Resident PGY4  [facility]

## 2024-08-19 NOTE — PROGRESS NOTES
Obstetric/Gynecology Resident Interval Note    Called to room by RN due to change in patient status. Per nurse, patient now has absent reflexes in lower extremities. Writer attempted to obtain reflexes, but was unable to obtain. Clonus absent. BUE were 2+/4. Lungs CTA and RRR. Will order stat mag level to assess for mag toxicity. Patient currently denies any symptoms of PreE. Will monitor closely.    Bhumika Rm MD  OB/GYN Resident, PGY2  [place], [place]  8/19/2024, 1:55 PM

## 2024-08-19 NOTE — ANESTHESIA POSTPROCEDURE EVALUATION
Department of Anesthesiology  Postprocedure Note    Patient: Renate Mcbride  MRN: [id number]  YOB: 1995  Date of evaluation: 8/19/2024    Procedure Summary       Date: 08/19/24 Room / Location:     Anesthesia Start: 1120 Anesthesia Stop: 1812    Procedure: Labor Analgesia Diagnosis:     Scheduled Providers:  Responsible Provider: Manpreet Diaz MD    Anesthesia Type: epidural ASA Status: 3            Anesthesia Type: No value filed.    Esteban Phase I:      Esteban Phase II:      Anesthesia Post Evaluation    Patient location during evaluation: bedside  Patient participation: complete - patient participated  Level of consciousness: awake  Pain score: 2  Airway patency: patent  Nausea & Vomiting: no nausea and no vomiting  Cardiovascular status: blood pressure returned to baseline  Respiratory status: acceptable  Hydration status: stable  Pain management: adequate        No notable events documented.

## 2024-08-20 LAB
HCT VFR BLD AUTO: 31.7 % (ref 36.3–47.1)
HGB BLD-MCNC: 10.3 G/DL (ref 11.9–15.1)
RESULT: NORMAL

## 2024-08-20 PROCEDURE — 2580000003 HC RX 258

## 2024-08-20 PROCEDURE — 2580000003 HC RX 258: Performed by: OBSTETRICS & GYNECOLOGY

## 2024-08-20 PROCEDURE — 6360000002 HC RX W HCPCS

## 2024-08-20 PROCEDURE — 85014 HEMATOCRIT: CPT

## 2024-08-20 PROCEDURE — 85461 HEMOGLOBIN FETAL: CPT

## 2024-08-20 PROCEDURE — 99024 POSTOP FOLLOW-UP VISIT: CPT | Performed by: OBSTETRICS & GYNECOLOGY

## 2024-08-20 PROCEDURE — 36415 COLL VENOUS BLD VENIPUNCTURE: CPT

## 2024-08-20 PROCEDURE — 1220000000 HC SEMI PRIVATE OB R&B

## 2024-08-20 PROCEDURE — 6370000000 HC RX 637 (ALT 250 FOR IP)

## 2024-08-20 PROCEDURE — 85018 HEMOGLOBIN: CPT

## 2024-08-20 RX ADMIN — IBUPROFEN 600 MG: 600 TABLET, FILM COATED ORAL at 11:05

## 2024-08-20 RX ADMIN — IBUPROFEN 600 MG: 600 TABLET, FILM COATED ORAL at 21:17

## 2024-08-20 RX ADMIN — SODIUM CHLORIDE, PRESERVATIVE FREE 10 ML: 5 INJECTION INTRAVENOUS at 21:20

## 2024-08-20 RX ADMIN — SODIUM CHLORIDE, POTASSIUM CHLORIDE, SODIUM LACTATE AND CALCIUM CHLORIDE: 600; 310; 30; 20 INJECTION, SOLUTION INTRAVENOUS at 00:17

## 2024-08-20 RX ADMIN — MAGNESIUM SULFATE HEPTAHYDRATE 2000 MG/HR: 40 INJECTION, SOLUTION INTRAVENOUS at 15:21

## 2024-08-20 RX ADMIN — FERROUS SULFATE TAB EC 325 MG (65 MG FE EQUIVALENT) 325 MG: 325 (65 FE) TABLET DELAYED RESPONSE at 08:13

## 2024-08-20 RX ADMIN — SENNOSIDES AND DOCUSATE SODIUM 2 TABLET: 50; 8.6 TABLET ORAL at 21:17

## 2024-08-20 RX ADMIN — MAGNESIUM SULFATE HEPTAHYDRATE 2000 MG/HR: 40 INJECTION, SOLUTION INTRAVENOUS at 05:14

## 2024-08-20 RX ADMIN — Medication 400 MG: at 08:15

## 2024-08-20 RX ADMIN — LABETALOL HYDROCHLORIDE 400 MG: 200 TABLET, FILM COATED ORAL at 08:13

## 2024-08-20 RX ADMIN — LABETALOL HYDROCHLORIDE 400 MG: 200 TABLET, FILM COATED ORAL at 15:29

## 2024-08-20 RX ADMIN — LABETALOL HYDROCHLORIDE 400 MG: 200 TABLET, FILM COATED ORAL at 21:17

## 2024-08-20 RX ADMIN — ACETAMINOPHEN 1000 MG: 500 TABLET ORAL at 14:32

## 2024-08-20 RX ADMIN — ACETAMINOPHEN 1000 MG: 500 TABLET ORAL at 06:29

## 2024-08-20 RX ADMIN — IBUPROFEN 600 MG: 600 TABLET, FILM COATED ORAL at 03:31

## 2024-08-20 ASSESSMENT — PAIN DESCRIPTION - DESCRIPTORS
DESCRIPTORS: ACHING
DESCRIPTORS: ACHING;CRAMPING;SORE
DESCRIPTORS: ACHING;SORE

## 2024-08-20 ASSESSMENT — PAIN SCALES - GENERAL
PAINLEVEL_OUTOF10: 4
PAINLEVEL_OUTOF10: 3
PAINLEVEL_OUTOF10: 1
PAINLEVEL_OUTOF10: 3
PAINLEVEL_OUTOF10: 3

## 2024-08-20 ASSESSMENT — PAIN DESCRIPTION - LOCATION
LOCATION: ABDOMEN;BACK;PELVIS
LOCATION: ABDOMEN;PERINEUM
LOCATION: ABDOMEN;BACK
LOCATION: ABDOMEN;BACK;PERINEUM

## 2024-08-20 ASSESSMENT — PAIN DESCRIPTION - ORIENTATION: ORIENTATION: LOWER

## 2024-08-20 ASSESSMENT — PAIN - FUNCTIONAL ASSESSMENT: PAIN_FUNCTIONAL_ASSESSMENT: ACTIVITIES ARE NOT PREVENTED

## 2024-08-20 NOTE — CARE COORDINATION
Social Work     Sw reviewed medical record (current active problem list) and tox screens and found no current concerns.     Sw spoke with mom and dad briefly to explain Sw role, inquire if any needs or concerns, and provide safe sleep education and discuss.  Mom denied any needs or questions and informs baby has a safe sleep environment (crib and bass).     Mom denied any current s/s of anxiety or depression and is aware to reach out to OB if any s/s occur after dc.     Mom reports a really good support system with local family and denied any current questions or needs.      Mom reports this is her 2nd child, she also has a 2 year old son.       Mom states ped will be NP Brigitte Freitas.      Sw encouraged mom to reach out if any issues or concerns arise.

## 2024-08-20 NOTE — PROGRESS NOTES
POST PARTUM DAY # 1    Renate Mcbride is a 28 y.o. female  This patient was seen & examined today.     Her pregnancy was complicated by:   Patient Active Problem List   Diagnosis    cHTN (meds) w/ GERMÁN w/o SF    Hx gHTN (G1)    FHx VTE    GDMA1 (G4)    Elevated AST (SGOT)    35 weeks gestation of pregnancy    Severe pre-eclampsia in third trimester    Chronic hypertension affecting pregnancy     24 M Apt 8/9 Wt 7#0       Today she is doing well without any chief complaint. Her lochia is light. She denies chest pain, shortness of breath, headache, lightheadedness, and blurred vision. She is  bottle feeding and she denies any breast tenderness. She is ambulating well. Her voiding pattern is normal. I reviewed signs and symptoms of post partum depression with the patient, she currently denies any of these symptoms. She is tolerating solids.     Vital Signs:  Vitals:    24 0115 24 0215 24 0329 24 0418   BP: 103/74 125/79 98/61 122/76   Pulse: 88 83 73 83   Resp: 16 16 16 16   Temp:   97.8 °F (36.6 °C)    TempSrc:   Oral    SpO2: 97% 98% 98%        Physical Exam:  General:  no apparent distress, alert, and cooperative  Neurologic:  alert, oriented, normal speech, no focal findings or movement disorder noted  Lungs:  No increased work of breathing, good air exchange  Heart:  regular rate and rhythm    Abdomen: abdomen soft, non-distended, non-tender  Fundus: non-tender, normal size, firm, below umbilicus  Extremities:  no calf tenderness, non edematous    Lab:  Lab Results   Component Value Date    HGB 11.5 (L) 2024     Lab Results   Component Value Date    HCT 35.2 (L) 2024       Assessment/Plan:  Renate Mcbride is a  PPD # 1 s/p    - Doing well, VSS   - Male infant in General Care Nursery, circumcision consent/orders done   - Encourage ambulation   - Postpartum Hgb/Hct awaiting   - Motrin/Tylenol    Rh negative/Rubella immune   - Rhogam PP indicated  - No indication for

## 2024-08-20 NOTE — CARE COORDINATION
CASE MANAGEMENT POST-PARTUM TRANSITIONAL CARE PLAN    35 weeks gestation of pregnancy [Z3A.35]    OB Provider: Dr. Francisco    Writer met w/ Renate and Ty at her bedside to discuss DCP. She is S/P  on 24 @ 36w0d at 1812 of male infant    Writer verified address/phone number correct on facesheet. She states she lives with her  Ty and their son. She denied barriers with transportation home, to doctors appointments or with paying for medications upon discharge home.     [employer] insurance correct. Writer notified her she has 30 days from date of birth to add  to insurance policy via Work day. She verbalized understanding.    Infant name on BC: Sudhir.   Infant PCP Brigitte Freitas CNP @ [facility].     DME: BP cuff, GDM supplies  HOME CARE: none    Anticipate DC home of couplet in private vehicle in 1-2 days status post vaginal delivery.    Readmission Risk              Risk of Unplanned Readmission:  5

## 2024-08-20 NOTE — PROGRESS NOTES
Resident Interval Magnesium Sulfate Note    Renate Mcbride is a 28 y.o. female  PPD# 1 s/p   The patient is resting comfortably. She denies headache, visual changes, and abdominal pain in the right upper quadrant. She denies any shortness of breath or chest pain. She denies change in her extremities, regarding swelling.    Continuous Medications:    sodium chloride      lactated ringers IV soln 75 mL/hr at 24 0215    magnesium sulfate 2,000 mg/hr (24 0615)       Vitals:    Vitals:    24 0910 24 1010 24 1100 24 1200   BP: 111/73 108/71 125/89 121/89   Pulse: 78 76 80 89   Resp: 16 16 16 16   Temp:       TempSrc:       SpO2: 98% 97% 98% 99%        Physical Exam:  Chest: clear to auscultation bilaterally  Heart: RRR no murmur  Abdomen: soft, nontender, nondistended  Extremities: DTR normal Bilateral lower extremities Right: 2/4   Left: 2/4  Clonus: absent    Urine Output: 440/hr in 5 hrs; Clear urine    Labs:  Last Magnesium Level:   Lab Results   Component Value Date/Time    MG 5.7 2024 03:21 PM       BMP:    Recent Labs     24  1631      K 3.9      CO2 19*   BUN 10   CREATININE 0.5   GLUCOSE 106*       ASSESSMENT/PLAN  Renate Mcbride is a 28 y.o. female  PPD# 1 s/p    - Continue Magnesium Sulfate Treatment 2g/hr, off @ 1812 on 24   - Mag levels q6hrs per provider, Mag 5.7   - BPs normotensive   - Patient denies any s/s PreE   - UOP adequate   - PreE labs wnl, P/C 0.61   - Currently controlled on Labetalol 300 TID>400 TID ()    - Will continue to monitor closely    Kamini Ivan DO  Ob/Gyn Resident  2024, 12:38 PM

## 2024-08-20 NOTE — PROGRESS NOTES
Resident Interval Magnesium Sulfate Note    Renate Mcbride is a 28 y.o. female  PPD# 0 s/p   The patient is resting comfortably. She denies headache and abdominal pain in the right upper quadrant. She reports changes to her vision has significantly improved. She denies any shortness of breath or chest pain. She denies change in her extremities, regarding swelling.    Continuous Medications:    sodium chloride      lactated ringers IV soln 75 mL/hr at 24    magnesium sulfate 2,000 mg/hr (24)       Vitals:    Vitals:    24   BP: 132/86 123/79 128/88 134/89   Pulse: 93 91 91 92   Resp:     Temp:  98.5 °F (36.9 °C) 97.8 °F (36.6 °C)    TempSrc:  Oral Oral    SpO2:  97% 98% 97%       Physical Exam:  Chest: clear to auscultation bilaterally  Heart: RRR no murmur  Abdomen: soft, nontender, nondistended  Extremities: DTR normal Bilateral lower extremities Right: +2/4   Left: +2/4  Clonus: absent    Urine Output: 900 over 11 hours 81mL/hr; Clear urine    Labs:  Last Magnesium Level:   Lab Results   Component Value Date/Time    MG 5.7 2024 03:21 PM       BMP:    Recent Labs     24  1631      K 3.9      CO2 19*   BUN 10   CREATININE 0.5   GLUCOSE 106*       ASSESSMENT/PLAN  Renate Mcbride is a 28 y.o. female  PPD# 0 s/p  complicated by cHTN (meds) with GERMÁN with SF (VC)   - Continue Magnesium Sulfate Treatment 2g/hr, off @ 1812 on 24   - BPs normotensive   - Patient denies any s/s PreE   - UOP adequate   - PreE labs wnl, P/C 0.61   - Currently controlled on Labetalol 300 > 400 TID    - Will continue to monitor closely     Kiera Hartman MD  Ob/Gyn Resident  2024, 10:45 PM    Resident Physician Statement  I have discussed the case, including pertinent history and exam findings with the above cristi resident. I have personally seen the patient. I agree with the assessment, plan and orders as

## 2024-08-20 NOTE — PROGRESS NOTES
Resident Interval Magnesium Sulfate Note    Renate Mcbride is a 28 y.o. female  PPD# 1 s/p   The patient is resting comfortably. She denies headache, visual changes, and abdominal pain in the right upper quadrant. She denies any shortness of breath or chest pain. She denies change in her extremities, regarding swelling.    Continuous Medications:    sodium chloride      lactated ringers IV soln 75 mL/hr at 24 0215    magnesium sulfate 2,000 mg/hr (24 0418)       Vitals:    Vitals:    24 0115 24 0215 24 0329 24 0418   BP: 103/74 125/79 98/61 122/76   Pulse: 88 83 73 83   Resp: 16 16 16 16   Temp:   97.8 °F (36.6 °C)    TempSrc:   Oral    SpO2: 97% 98% 98%        Physical Exam:  Chest: clear to auscultation bilaterally  Heart: RRR no murmur  Abdomen: soft, nontender, nondistended  Extremities: DTR normal Bilateral lower extremities Right: +2/4   Left: +2/4  Clonus: absent    Urine Output: 600mL over 4.25hrs. 141mL/hr; Clear urine    Labs:  Last Magnesium Level:   Lab Results   Component Value Date/Time    MG 5.7 2024 03:21 PM       BMP:    Recent Labs     24  1631      K 3.9      CO2 19*   BUN 10   CREATININE 0.5   GLUCOSE 106*       ASSESSMENT/PLAN  Renate Mcbride is a 28 y.o. female  PPD# 1 s/p  complicated by cHTN (meds) with GERMÁN with SF (VC)   - Continue Magnesium Sulfate Treatment 2g/hr, off @ 181 on 24   - Mag levels 5.7   - BPs normotensive    - Patient denies any s/s PreE   - UOP adequate    - PreE labs wnl, P/C 0.61   - Currently controlled on Labetalol 300>400 TID   - Strict I's and O's    - Will continue to monitor closely    Kiera Hartman MD  Ob/Gyn Resident  2024, 4:32 AM    Resident Physician Statement  I have discussed the case, including pertinent history and exam findings with the above cristi resident. I have personally seen the patient. I agree with the assessment, plan and orders as documented. I have made

## 2024-08-20 NOTE — PROGRESS NOTES
Obstetrical Rounds:    POD/PPD #: 1  Hospital Day: 2  Procedure: normal spontaneous vaginal delivery    Date: 2024  Time: 9:16 AM        Patient Name: Renate Mcbride  Patient : 1995  Room/Bed: 46/0746-01  Admission Date/Time: 2024  4:01 PM  MRN #: [id number]  [facility] #: [id number]        Attending Physician Statement  I have discussed the care of Renate Mcbride, including pertinent history and exam findings,  with the resident. I have reviewed their note in the electronic medical record. I have seen and examined the patient and the key elements of all parts of the encounter have been performed/reviewed by me .  I agree with the assessment, plan and orders as documented by the resident. Pt seen & examined. No c/c. Plan to c/w MgSO4 x 24 hours. BPs stable. Plan discharge home tomorrow    Vitals:    24 0910   BP: 111/73   Pulse: 78   Resp: 16   Temp:    SpO2: 98%       Admission on 2024   Component Date Value Ref Range Status    WBC 2024 9.0  3.5 - 11.3 k/uL Final    RBC 2024 3.84 (L)  3.95 - 5.11 m/uL Final    Hemoglobin 2024 11.0 (L)  11.9 - 15.1 g/dL Final    Hematocrit 2024 33.8 (L)  36.3 - 47.1 % Final    MCV 2024 88.0  82.6 - 102.9 fL Final    MCH 2024 28.6  25.2 - 33.5 pg Final    MCHC 2024 32.5  28.4 - 34.8 g/dL Final    RDW 2024 14.3  11.8 - 14.4 % Final    Platelets 2024 182  138 - 453 k/uL Final    MPV 2024 11.3  8.1 - 13.5 fL Final    NRBC Automated 2024 0.0  0.0 per 100 WBC Final    Neutrophils % 2024 69 (H)  36 - 65 % Final    Lymphocytes % 2024 21 (L)  24 - 43 % Final    Monocytes % 2024 7  3 - 12 % Final    Eosinophils % 2024 2  1 - 4 % Final    Basophils % 2024 0  0 - 2 % Final    Immature Granulocytes % 2024 1 (H)  0 % Final    Neutrophils Absolute 2024 6.22  1.50 - 8.10 k/uL Final    Lymphocytes Absolute 2024 1.93  1.10 - 3.70 k/uL Final    Monocytes Absolute  contract. If painful, your doctor may recommend a pain reliever. If you are breastfeeding, it's important to ask your doctor about taking medications. You may receive from the hospital a list of medications to avoid.   Once your doctor has approved your medications, it's important to:   Take your medication as directed. Do not change the amount or the schedule.   Do not stop taking them without talking to your doctor.   Do not share them.   Know what the results and side effects may be. Report bothersome side effects to your doctor.   Some drugs can be dangerous when mixed. Talk to a doctor or pharmacist if you are taking more than one drug. This includes over-the-counter medication and herb or dietary supplements.   Plan ahead for refills so you don't run out.   Lifestyle Changes    Having a baby requires significant lifestyle changes. You and your doctor will plan lifestyle changes that will help you recover. Some things to keep in mind include:   It is important to get enough rest so you can recover. Try sleeping when the baby sleeps.   Ask your doctor when you can resume sexual relations. If you have not done so already, talk to your doctor about birth control options.   If you are breastfeeding, consider a breast pump.   Call your obstetrician and/or pediatrician for any questions that arise.   Understand the changes your body is going through as it recovers from childbirth:   Hot and cold flashes as your body adjusts to new hormone and blood flow levels   Urinary or fecal incontinence due to stretched muscles   After pains from uterine contractions as the uterus shrinks   Vaginal bleeding (called lochia), which is heavier than your period (generally stops within two months)   Baby blues, feelings of irritability, sadness, crying, or anxiety. Postpartum depression is more severe, occurring in 10%-20% of new moms. If you experience such symptoms, contact your doctor.   Consider joining a support group for new  mothers. You can get encouragement and learn parenting strategies.   Follow-up   Schedule a follow-up appointment as directed by your doctor.   Call Your Doctor If Any of the Following Occurs   It is important for you to monitor your recovery once you leave the hospital. That way, you can alert your doctor to any problems immediately. If any of the following occurs, call your doctor:   Signs of infection, including fever and chills    Increased bleeding: soaking more than one sanitary pad an hour    Wounds that become red, swollen or drain pus    Vaginal discharge that smells foul    New pain, swelling, or tenderness in your legs    Pain that you can't control with the medications you've been given    Pain, burning, urgency or frequency of urination, or persistent bleeding in the urine    Cough, shortness of breath, or chest pain    Depression, suicidal thoughts, or feelings of harming your baby    Breasts that are hot, red and accompanied by fever    Any cracking or bleeding from the nipple or areola (the dark-colored area of the breast)      In case of an emergency, call 911 immediately.          Home care, Restrictions,  Follow up Care, and birth control review completed    RTO 1 weeks    Secondary Smoke risks and Sudden Infant Death Syndrome were reviewed with recommendations.  Cessation options discussed.    Infant sleeping, \"back to sleep\" and avoidance of co-sleeping recommendations were reviewed.    Signs and Symptoms of Post Partum Depression were reviewed. The patient is to call if any occur.    Signs and symptoms of Mastitis were reviewed. The patient is to call if any occur for follow up.      Attending's Name:  Electronically signed by Cristal Sales DO on 8/20/2024 at 9:16 AM

## 2024-08-20 NOTE — PROGRESS NOTES
Resident Interval Magnesium Sulfate Note    Renate Mcbride is a 28 y.o. female  PPD# 1 s/p   The patient is resting comfortably. She denies headache, visual changes, and abdominal pain in the right upper quadrant. She denies any shortness of breath or chest pain. She denies change in her extremities, regarding swelling.    Continuous Medications:    sodium chloride      lactated ringers IV soln 75 mL/hr at 24 0215    magnesium sulfate 2,000 mg/hr (24 0615)       Vitals:    Vitals:    24 0515 24 0615 24 0715 24 0813   BP: 100/61 126/83 124/88 (!) 126/91   Pulse: 77 60 78 82   Resp:  16    Temp:   98.6 °F (37 °C)    TempSrc:   Oral    SpO2: 98% 98% 98%        Physical Exam:  Chest: clear to auscultation bilaterally  Heart: RRR no murmur  Abdomen: soft, nontender, nondistended  Extremities: DTR normal Bilateral lower extremities Right: +2/4   Left: +2/4  Clonus: absent    Urine Output: 700mL over 2.75hrs. 254mL/hr; Clear urine    Labs:  Last Magnesium Level:   Lab Results   Component Value Date/Time    MG 5.7 2024 03:21 PM       BMP:    Recent Labs     24  1631      K 3.9      CO2 19*   BUN 10   CREATININE 0.5   GLUCOSE 106*       ASSESSMENT/PLAN  Renate Mcbride is a 28 y.o. female  PPD# 1 s/p  complicated by cHTN (meds) with GERMÁN with SF (VC)    - Continue Magnesium Sulfate Treatment 2g/hr, off @ 1812 on 24   - Mag levels 5.7   - BPs normotensive    - Patient denies any s/s PreE   - UOP adequate   - PreE labs wnl, P/C 0.61   - Currently controlled on labetalol 400mg TID    - Strict I's and O's    - Will continue to monitor closely    Kiera Hartman MD  Ob/Gyn Resident  2024, 8:34 AM    Resident Physician Statement  I have discussed the case, including pertinent history and exam findings with the above cristi resident. I have personally seen the patient. I agree with the assessment, plan and orders as documented. I have made  changes to the above note as needed. I have discussed the case with above named attending.     Trish Way DO  OB/GYN Resident  8/20/2024  8:48 AM

## 2024-08-21 VITALS
RESPIRATION RATE: 16 BRPM | SYSTOLIC BLOOD PRESSURE: 133 MMHG | TEMPERATURE: 98.2 F | OXYGEN SATURATION: 98 % | DIASTOLIC BLOOD PRESSURE: 91 MMHG | HEART RATE: 76 BPM

## 2024-08-21 PROCEDURE — 6370000000 HC RX 637 (ALT 250 FOR IP)

## 2024-08-21 PROCEDURE — 6360000002 HC RX W HCPCS: Performed by: STUDENT IN AN ORGANIZED HEALTH CARE EDUCATION/TRAINING PROGRAM

## 2024-08-21 PROCEDURE — 96372 THER/PROPH/DIAG INJ SC/IM: CPT

## 2024-08-21 RX ORDER — LABETALOL 200 MG/1
400 TABLET, FILM COATED ORAL 3 TIMES DAILY
Qty: 60 TABLET | Refills: 3 | Status: SHIPPED | OUTPATIENT
Start: 2024-08-21 | End: 2024-08-23

## 2024-08-21 RX ADMIN — FERROUS SULFATE TAB EC 325 MG (65 MG FE EQUIVALENT) 325 MG: 325 (65 FE) TABLET DELAYED RESPONSE at 08:51

## 2024-08-21 RX ADMIN — ACETAMINOPHEN 1000 MG: 500 TABLET ORAL at 04:13

## 2024-08-21 RX ADMIN — LABETALOL HYDROCHLORIDE 400 MG: 200 TABLET, FILM COATED ORAL at 08:51

## 2024-08-21 RX ADMIN — Medication 400 MG: at 08:51

## 2024-08-21 RX ADMIN — HUMAN RHO(D) IMMUNE GLOBULIN 300 MCG: 300 INJECTION, SOLUTION INTRAMUSCULAR at 09:51

## 2024-08-21 ASSESSMENT — PAIN DESCRIPTION - LOCATION: LOCATION: ABDOMEN

## 2024-08-21 ASSESSMENT — PAIN SCALES - GENERAL: PAINLEVEL_OUTOF10: 3

## 2024-08-21 NOTE — PROGRESS NOTES
POST PARTUM DAY # 2    Renate Mcbride is a 28 y.o. female  This patient was seen & examined today.     Her pregnancy was complicated by:   Patient Active Problem List   Diagnosis    cHTN (meds) w/ GERMÁN w/o SF    Hx gHTN (G1)    FHx VTE    GDMA1 (G4)    Elevated AST (SGOT)    35 weeks gestation of pregnancy    Severe pre-eclampsia in third trimester    Chronic hypertension affecting pregnancy     24 M Apt 8/ Wt 7#0       Today she is doing well without any chief complaint. Her lochia is light. She denies chest pain, shortness of breath, headache, lightheadedness, and blurred vision. She is bottle feeding and she denies any breast tenderness. She is ambulating well. Her voiding pattern is normal. I reviewed signs and symptoms of post partum depression with the patient, she currently denies any of these symptoms. She is tolerating solids.     Vital Signs:  Vitals:    24 1606 24 1705 24 1805 24 2117   BP: 123/84 120/83 126/88 (!) 132/92   Pulse: 75 72 72 83   Resp: 16 16 16 17   Temp:    99 °F (37.2 °C)   TempSrc:    Oral   SpO2: 98% 98% 99% 98%       Physical Exam:  General:  no apparent distress, alert, and cooperative  Neurologic:  alert, oriented, normal speech, no focal findings or movement disorder noted  Lungs:  No increased work of breathing, good air exchange  Heart:  regular rate and rhythm    Abdomen: abdomen soft, non-distended, non-tender  Fundus: non-tender, normal size, firm, below umbilicus  Extremities:  no calf tenderness, non edematous    Lab:  Lab Results   Component Value Date    HGB 10.3 (L) 2024     Lab Results   Component Value Date    HCT 31.7 (L) 2024       Assessment/Plan:  Renate Mcbride is a  PPD # 2 s/p    - Doing well, VSS   - Male infant in General Care Nursery, circumcision completed   - Encourage ambulation   - Postpartum Hgb/Hct completed Hgb 10.3   - Motrin/Tylenol   - Reglan/Benadryl   - Mag ox/Meclizine     Rh negative/Rubella

## 2024-08-21 NOTE — FLOWSHEET NOTE
Discharge instructions given with understanding voiced. Pt states she has home BP cuff. Recording log given and explained to pt with understanding voiced. Instructed to take log to follow up appt.[facility] your life: Post-Birth Warning Signs handout reviewed and given to mother. Understanding verbalized.

## 2024-08-21 NOTE — PROGRESS NOTES
CLINICAL PHARMACY NOTE: MEDS TO BEDS    Total # of Prescriptions Filled: 3   The following medications were delivered to the patient:  Ibuprofen  Senna  acetaminophen    Additional Documentation: [place]

## 2024-08-22 LAB
COMPONENT: NORMAL
STATUS OF UNITS: NORMAL
TRANSFUSION STATUS: NORMAL
UNIT DIVISION: 0
UNIT NUMBER: NORMAL

## 2024-08-22 NOTE — PROGRESS NOTES
Patient presented for BP check- /80  HR 76.  she is  taking labetalol as directed. Pt needs refill for 400mg labetalol because [facility] will not refill it.      2 week PP 9/4  6 week PP 10/1

## 2024-08-23 ENCOUNTER — TELEPHONE (OUTPATIENT)
Dept: OBGYN CLINIC | Age: 29
End: 2024-08-23

## 2024-08-23 ENCOUNTER — NURSE ONLY (OUTPATIENT)
Dept: OBGYN CLINIC | Age: 29
End: 2024-08-23

## 2024-08-23 VITALS
SYSTOLIC BLOOD PRESSURE: 144 MMHG | HEIGHT: 66 IN | BODY MASS INDEX: 30.05 KG/M2 | HEART RATE: 76 BPM | WEIGHT: 187 LBS | DIASTOLIC BLOOD PRESSURE: 80 MMHG

## 2024-08-23 LAB — SURGICAL PATHOLOGY REPORT: NORMAL

## 2024-08-23 RX ORDER — LABETALOL 200 MG/1
600 TABLET, FILM COATED ORAL 3 TIMES DAILY
Qty: 60 TABLET | Refills: 3 | Status: SHIPPED | OUTPATIENT
Start: 2024-08-23

## 2024-08-23 NOTE — PROGRESS NOTES
Would recommend increasing to labetalol to 600 mg TID given elevated BP. Tamara to notify the patient.

## 2024-08-29 DIAGNOSIS — O16.1 ELEVATED BLOOD PRESSURE AFFECTING PREGNANCY IN FIRST TRIMESTER, ANTEPARTUM: ICD-10-CM

## 2024-08-29 RX ORDER — LABETALOL 200 MG/1
600 TABLET, FILM COATED ORAL 3 TIMES DAILY
Qty: 60 TABLET | Refills: 3 | Status: SHIPPED | OUTPATIENT
Start: 2024-08-29

## 2024-08-29 NOTE — TELEPHONE ENCOUNTER
Patient got her lebetalol from the pharmacy and it was for 1 pill tid- after speaking with the pharmacist they had called the office to clarify so and it was told that 1 pill tid was correct- they needed new rx with updated to have 600mg tid of the lebetalol

## 2024-08-30 ENCOUNTER — HOSPITAL ENCOUNTER (OUTPATIENT)
Age: 29
Setting detail: SPECIMEN
Discharge: HOME | End: 2024-08-30

## 2024-08-30 ENCOUNTER — OFFICE VISIT (OUTPATIENT)
Dept: FAMILY MEDICINE CLINIC | Age: 29
End: 2024-08-30
Payer: COMMERCIAL

## 2024-08-30 VITALS
WEIGHT: 175.8 LBS | SYSTOLIC BLOOD PRESSURE: 108 MMHG | HEART RATE: 102 BPM | DIASTOLIC BLOOD PRESSURE: 78 MMHG | BODY MASS INDEX: 28.37 KG/M2 | TEMPERATURE: 98.8 F | RESPIRATION RATE: 16 BRPM | OXYGEN SATURATION: 97 %

## 2024-08-30 DIAGNOSIS — N39.0 ACUTE UTI: ICD-10-CM

## 2024-08-30 DIAGNOSIS — R35.0 FREQUENT URINATION: ICD-10-CM

## 2024-08-30 DIAGNOSIS — N39.0 ACUTE UTI: Primary | ICD-10-CM

## 2024-08-30 DIAGNOSIS — R30.0 DYSURIA: ICD-10-CM

## 2024-08-30 LAB
BILIRUBIN, POC: NORMAL
BLOOD URINE, POC: NORMAL
CLARITY, POC: NORMAL
COLOR, POC: NORMAL
GLUCOSE URINE, POC: NORMAL MG/DL
KETONES, POC: NORMAL MG/DL
LEUKOCYTE EST, POC: NORMAL
NITRITE, POC: NORMAL
PH, POC: 6
PROTEIN, POC: 30 MG/DL
SPECIFIC GRAVITY, POC: 1.02
UROBILINOGEN, POC: 0.2 MG/DL

## 2024-08-30 PROCEDURE — 81003 URINALYSIS AUTO W/O SCOPE: CPT | Performed by: NURSE PRACTITIONER

## 2024-08-30 RX ORDER — CEPHALEXIN 500 MG/1
500 CAPSULE ORAL 2 TIMES DAILY
Qty: 10 CAPSULE | Refills: 0 | Status: SHIPPED | OUTPATIENT
Start: 2024-08-30 | End: 2024-09-04

## 2024-08-30 ASSESSMENT — ENCOUNTER SYMPTOMS
ABDOMINAL PAIN: 0
BACK PAIN: 1
NAUSEA: 0
VOMITING: 0

## 2024-08-30 NOTE — PROGRESS NOTES
[facility] PHYSICIANS [facility], [facility] WALK-IN  [address]  [address]  [place] [place] [address]  Dept: [phone]  Dept Fax: [phone]    Renate Mcbride is a 28 y.o. female who presents today for her medical conditions/complaints of   Chief Complaint   Patient presents with    Urinary Frequency     Painful/burning urination, back pain x48 hrs     Gave birth 9 days          HPI:     /78   Pulse (!) 102   Temp 98.8 °F (37.1 °C)   Resp 16   Wt 79.7 kg (175 lb 12.8 oz)   LMP 12/11/2023 (Exact Date)   SpO2 97%   Breastfeeding No   BMI 28.37 kg/m²       HPI  Patient presented to the urgent care with complaints of dysuria x 2 days.  This is a new problem.  The symptom occurs constantly. Associated symptoms increased frequency of urination, urgency, low back ache. Patient has no fever, chills, nausea, vomiting.  Patient does not have a history of recurrent UTI.  Patient does not have a history of pyelonephritis.  She is 9 days post partum. Not breastfeeding.     Past Medical History:   Diagnosis Date    Stomach ulcer 2016        Past Surgical History:   Procedure Laterality Date    DILATION AND CURETTAGE OF UTERUS N/A 6/8/2023    DILATATION AND CURETTAGE SUCTION performed by Ty Argueta DO at [facility] OR    ENDOSCOPY, COLON, DIAGNOSTIC      FRACTURE SURGERY Right     Elbow    TONSILLECTOMY         No family history on file.    Social History     Tobacco Use    Smoking status: Never    Smokeless tobacco: Never   Substance Use Topics    Alcohol use: Not Currently        Prior to Visit Medications    Medication Sig Taking? Authorizing Provider   cephALEXin (KEFLEX) 500 MG capsule Take 1 capsule by mouth 2 times daily for 5 days Yes Shalonda Deleon, APRN - CNP   labetalol (NORMODYNE) 200 MG tablet Take 3 tablets by mouth in the morning, at noon, and at bedtime Yes Eva Francisco DO   ibuprofen (ADVIL;MOTRIN) 600 MG tablet Take 1 tablet by mouth every 6 hours as

## 2024-08-31 LAB
MICROORGANISM SPEC CULT: NORMAL
SERVICE CMNT-IMP: NORMAL
SPECIMEN DESCRIPTION: NORMAL

## 2024-09-04 ENCOUNTER — POSTPARTUM VISIT (OUTPATIENT)
Dept: OBGYN CLINIC | Age: 29
End: 2024-09-04

## 2024-09-04 VITALS — BODY MASS INDEX: 28.73 KG/M2 | SYSTOLIC BLOOD PRESSURE: 118 MMHG | WEIGHT: 178 LBS | DIASTOLIC BLOOD PRESSURE: 62 MMHG

## 2024-09-04 DIAGNOSIS — O16.1 ELEVATED BLOOD PRESSURE AFFECTING PREGNANCY IN FIRST TRIMESTER, ANTEPARTUM: ICD-10-CM

## 2024-09-04 DIAGNOSIS — O24.410 DIET CONTROLLED GESTATIONAL DIABETES MELLITUS (GDM) IN THIRD TRIMESTER: ICD-10-CM

## 2024-09-04 PROBLEM — Z3A.35 35 WEEKS GESTATION OF PREGNANCY: Status: RESOLVED | Noted: 2024-08-18 | Resolved: 2024-09-04

## 2024-09-04 PROCEDURE — 99024 POSTOP FOLLOW-UP VISIT: CPT | Performed by: STUDENT IN AN ORGANIZED HEALTH CARE EDUCATION/TRAINING PROGRAM

## 2024-09-04 RX ORDER — LABETALOL 200 MG/1
200 TABLET, FILM COATED ORAL EVERY 12 HOURS
Qty: 60 TABLET | Refills: 3 | Status: SHIPPED | OUTPATIENT
Start: 2024-09-04

## 2024-09-04 ASSESSMENT — ANXIETY QUESTIONNAIRES
4. TROUBLE RELAXING: NOT AT ALL
5. BEING SO RESTLESS THAT IT IS HARD TO SIT STILL: NOT AT ALL
2. NOT BEING ABLE TO STOP OR CONTROL WORRYING: NOT AT ALL
3. WORRYING TOO MUCH ABOUT DIFFERENT THINGS: NOT AT ALL
6. BECOMING EASILY ANNOYED OR IRRITABLE: NOT AT ALL
IF YOU CHECKED OFF ANY PROBLEMS ON THIS QUESTIONNAIRE, HOW DIFFICULT HAVE THESE PROBLEMS MADE IT FOR YOU TO DO YOUR WORK, TAKE CARE OF THINGS AT HOME, OR GET ALONG WITH OTHER PEOPLE: NOT DIFFICULT AT ALL
1. FEELING NERVOUS, ANXIOUS, OR ON EDGE: NOT AT ALL
7. FEELING AFRAID AS IF SOMETHING AWFUL MIGHT HAPPEN: NOT AT ALL
GAD7 TOTAL SCORE: 0

## 2024-09-04 NOTE — PROGRESS NOTES
and STD counseling discussed  Continue with post operative restrictions until 6 weeks post partum  No heavy lifting or [place] until 6 weeks post partum    [facility]  [address]   [address]  [place], [address]  9/4/2024, 9:33 AM

## 2024-09-07 ENCOUNTER — APPOINTMENT (OUTPATIENT)
Dept: CT IMAGING | Age: 29
End: 2024-09-07
Payer: COMMERCIAL

## 2024-09-07 ENCOUNTER — HOSPITAL ENCOUNTER (EMERGENCY)
Age: 29
Discharge: HOME | End: 2024-09-07
Attending: EMERGENCY MEDICINE
Payer: COMMERCIAL

## 2024-09-07 VITALS
SYSTOLIC BLOOD PRESSURE: 130 MMHG | WEIGHT: 176.37 LBS | BODY MASS INDEX: 28.34 KG/M2 | TEMPERATURE: 98.1 F | HEART RATE: 79 BPM | DIASTOLIC BLOOD PRESSURE: 91 MMHG | HEIGHT: 66 IN | RESPIRATION RATE: 18 BRPM | OXYGEN SATURATION: 100 %

## 2024-09-07 DIAGNOSIS — R10.9 BILATERAL FLANK PAIN: Primary | ICD-10-CM

## 2024-09-07 LAB
ALBUMIN SERPL-MCNC: 4.3 G/DL (ref 3.5–5.2)
ALBUMIN/GLOB SERPL: 1.6 {RATIO} (ref 1–2.5)
ALP SERPL-CCNC: 120 U/L (ref 35–104)
ALT SERPL-CCNC: 40 U/L (ref 5–33)
ANION GAP SERPL CALCULATED.3IONS-SCNC: 13 MMOL/L (ref 9–17)
AST SERPL-CCNC: 27 U/L
BASOPHILS # BLD: 0.1 K/UL (ref 0–0.2)
BASOPHILS NFR BLD: 1 % (ref 0–2)
BILIRUB SERPL-MCNC: 0.5 MG/DL (ref 0.3–1.2)
BILIRUB UR QL STRIP: NEGATIVE
BUN SERPL-MCNC: 18 MG/DL (ref 6–20)
CALCIUM SERPL-MCNC: 9.8 MG/DL (ref 8.6–10.4)
CHARACTER UR: ABNORMAL
CHLORIDE SERPL-SCNC: 101 MMOL/L (ref 98–107)
CLARITY UR: CLEAR
CO2 SERPL-SCNC: 23 MMOL/L (ref 20–31)
COLOR UR: YELLOW
CREAT SERPL-MCNC: 0.5 MG/DL (ref 0.5–0.9)
EOSINOPHIL # BLD: 0.1 K/UL (ref 0–0.4)
EOSINOPHILS RELATIVE PERCENT: 2 % (ref 1–4)
EPI CELLS #/AREA URNS HPF: ABNORMAL /HPF (ref 0–5)
ERYTHROCYTE [DISTWIDTH] IN BLOOD BY AUTOMATED COUNT: 14.9 % (ref 12.5–15.4)
GFR, ESTIMATED: >90 ML/MIN/1.73M2
GLUCOSE SERPL-MCNC: 97 MG/DL (ref 70–99)
GLUCOSE UR STRIP-MCNC: NEGATIVE MG/DL
HCG UR QL: NEGATIVE
HCT VFR BLD AUTO: 39.4 % (ref 36–46)
HGB BLD-MCNC: 13.2 G/DL (ref 12–16)
HGB UR QL STRIP.AUTO: ABNORMAL
KETONES UR STRIP-MCNC: NEGATIVE MG/DL
LEUKOCYTE ESTERASE UR QL STRIP: ABNORMAL
LIPASE SERPL-CCNC: 23 U/L (ref 13–60)
LYMPHOCYTES NFR BLD: 2.5 K/UL (ref 1–4.8)
LYMPHOCYTES RELATIVE PERCENT: 35 % (ref 24–44)
MCH RBC QN AUTO: 28.6 PG (ref 26–34)
MCHC RBC AUTO-ENTMCNC: 33.5 G/DL (ref 31–37)
MCV RBC AUTO: 85.4 FL (ref 80–100)
MONOCYTES NFR BLD: 0.4 K/UL (ref 0.1–1.2)
MONOCYTES NFR BLD: 5 % (ref 2–11)
MUCOUS THREADS URNS QL MICRO: ABNORMAL
NEUTROPHILS NFR BLD: 57 % (ref 36–66)
NEUTS SEG NFR BLD: 4.2 K/UL (ref 1.8–7.7)
NITRITE UR QL STRIP: NEGATIVE
PH UR STRIP: 6 [PH] (ref 5–8)
PLATELET # BLD AUTO: 317 K/UL (ref 140–450)
PMV BLD AUTO: 8.2 FL (ref 6–12)
POTASSIUM SERPL-SCNC: 3.7 MMOL/L (ref 3.7–5.3)
PROT SERPL-MCNC: 7 G/DL (ref 6.4–8.3)
PROT UR STRIP-MCNC: NEGATIVE MG/DL
RBC # BLD AUTO: 4.62 M/UL (ref 4–5.2)
RBC #/AREA URNS HPF: ABNORMAL /HPF (ref 0–2)
SODIUM SERPL-SCNC: 137 MMOL/L (ref 135–144)
SP GR UR STRIP: 1.02 (ref 1–1.03)
UROBILINOGEN UR STRIP-ACNC: NORMAL EU/DL (ref 0–1)
WBC #/AREA URNS HPF: ABNORMAL /HPF (ref 0–5)
WBC OTHER # BLD: 7.3 K/UL (ref 3.5–11)

## 2024-09-07 PROCEDURE — 83690 ASSAY OF LIPASE: CPT

## 2024-09-07 PROCEDURE — 99284 EMERGENCY DEPT VISIT MOD MDM: CPT

## 2024-09-07 PROCEDURE — 96374 THER/PROPH/DIAG INJ IV PUSH: CPT

## 2024-09-07 PROCEDURE — 36415 COLL VENOUS BLD VENIPUNCTURE: CPT

## 2024-09-07 PROCEDURE — 74176 CT ABD & PELVIS W/O CONTRAST: CPT

## 2024-09-07 PROCEDURE — 81001 URINALYSIS AUTO W/SCOPE: CPT

## 2024-09-07 PROCEDURE — 81025 URINE PREGNANCY TEST: CPT

## 2024-09-07 PROCEDURE — 80053 COMPREHEN METABOLIC PANEL: CPT

## 2024-09-07 PROCEDURE — 6360000002 HC RX W HCPCS: Performed by: NURSE PRACTITIONER

## 2024-09-07 PROCEDURE — 87086 URINE CULTURE/COLONY COUNT: CPT

## 2024-09-07 PROCEDURE — 2580000003 HC RX 258: Performed by: NURSE PRACTITIONER

## 2024-09-07 PROCEDURE — 85025 COMPLETE CBC W/AUTO DIFF WBC: CPT

## 2024-09-07 RX ORDER — KETOROLAC TROMETHAMINE 10 MG/1
10 TABLET, FILM COATED ORAL EVERY 6 HOURS PRN
Qty: 20 TABLET | Refills: 0 | Status: SHIPPED | OUTPATIENT
Start: 2024-09-07 | End: 2024-09-12

## 2024-09-07 RX ORDER — 0.9 % SODIUM CHLORIDE 0.9 %
1000 INTRAVENOUS SOLUTION INTRAVENOUS ONCE
Status: COMPLETED | OUTPATIENT
Start: 2024-09-07 | End: 2024-09-07

## 2024-09-07 RX ORDER — KETOROLAC TROMETHAMINE 30 MG/ML
30 INJECTION, SOLUTION INTRAMUSCULAR; INTRAVENOUS ONCE
Status: COMPLETED | OUTPATIENT
Start: 2024-09-07 | End: 2024-09-07

## 2024-09-07 RX ADMIN — SODIUM CHLORIDE 1000 ML: 9 INJECTION, SOLUTION INTRAVENOUS at 18:41

## 2024-09-07 RX ADMIN — KETOROLAC TROMETHAMINE 30 MG: 30 INJECTION, SOLUTION INTRAMUSCULAR at 18:43

## 2024-09-07 ASSESSMENT — PAIN - FUNCTIONAL ASSESSMENT: PAIN_FUNCTIONAL_ASSESSMENT: 0-10

## 2024-09-07 ASSESSMENT — ENCOUNTER SYMPTOMS
COUGH: 0
VOMITING: 0
SHORTNESS OF BREATH: 0
ABDOMINAL PAIN: 1
BACK PAIN: 0
SORE THROAT: 0
DIARRHEA: 0
NAUSEA: 0

## 2024-09-07 ASSESSMENT — PAIN DESCRIPTION - PAIN TYPE: TYPE: ACUTE PAIN

## 2024-09-07 ASSESSMENT — PAIN DESCRIPTION - LOCATION: LOCATION: BACK;ABDOMEN

## 2024-09-07 ASSESSMENT — PAIN DESCRIPTION - DESCRIPTORS: DESCRIPTORS: ACHING

## 2024-09-07 ASSESSMENT — PAIN SCALES - GENERAL: PAINLEVEL_OUTOF10: 6

## 2024-09-07 ASSESSMENT — PAIN DESCRIPTION - ORIENTATION: ORIENTATION: LEFT

## 2024-09-07 NOTE — ED PROVIDER NOTES
[facility] EMERGENCY DEPARTMENT  EMERGENCY DEPARTMENT ENCOUNTER      Pt Name: Renate Mcbride  MRN: [id number]  Birthdate 1995  Date of evaluation: 2024  Provider: KARINA Dee CNP  8:14 PM    CHIEF COMPLAINT       Chief Complaint   Patient presents with    Back Pain    Abdominal Pain     Pt is 3 weeks postpartum.  Having mid back pains that radiate around into left quadrants abdomen.  Treated  1 week ago for UTI.         HISTORY OF PRESENT ILLNESS    Renate Mcbride is a 28 y.o. female who presents to the emergency department      This is a nontoxic-appearing 28-year-old female presenting via private auto, the patient drove herself to the emergency department, on 2024, the patient started developing flank pain, urinary symptoms went to a walk-in urgent care, was diagnosed with a urinary tract infection and started on Keflex 500 mg twice daily, she reports that symptoms had improved, until the last couple days this past Wednesday she reports that she started with bilateral flank pain again, over the last 24 hours she has had left-sided flank pain wrapping around her abdomen, states that she has not noticed any frequency or painful urination like she had previously had, she was concerned because the pain is not relieved with ibuprofen 600; she is 3 weeks postpartum vaginal delivery at 36 weeks due to preeclampsia, she is on labetalol which was recently decreased from 400 mg twice a day to 200 mg twice a day, she states she is still having some vaginal bleeding no abnormal vaginal discharge, she has had no fevers with this no nausea or vomiting, reports that she is moving her bowels without difficulty, she is not breast-feeding.  She is .  Patient has no previous history of renal colic.    The history is provided by the patient and medical records.       Nursing Notes were reviewed.    REVIEW OF SYSTEMS       Review of Systems   Constitutional:  Negative for chills, fatigue  Head: Normocephalic and atraumatic.      Right Ear: External ear normal.      Left Ear: External ear normal.      Nose: Nose normal.      Mouth/Throat:      Mouth: Mucous membranes are moist.   Eyes:      General:         Right eye: No discharge.         Left eye: No discharge.      Conjunctiva/sclera: Conjunctivae normal.   Cardiovascular:      Rate and Rhythm: Normal rate and regular rhythm.      Pulses: Normal pulses.   Pulmonary:      Effort: Pulmonary effort is normal.      Breath sounds: Normal breath sounds.   Abdominal:      General: There is no distension.      Palpations: Abdomen is soft. There is no mass.      Tenderness: There is no abdominal tenderness. There is left CVA tenderness. There is no right CVA tenderness or guarding.      Hernia: No hernia is present.      Comments: Abdomen is soft nontender no peritoneal signs guarding or rigidity.  No palpable masses.   Musculoskeletal:         General: No deformity or signs of injury.      Cervical back: Normal range of motion and neck supple. No rigidity.      Right lower leg: No edema.      Left lower leg: No edema.      Comments: Bilateral upper lower extremities have out of 5 motor strength without gross deformities or swelling.  No edema.   Skin:     General: Skin is warm and dry.      Capillary Refill: Capillary refill takes less than 2 seconds.   Neurological:      General: No focal deficit present.      Mental Status: She is alert and oriented to person, place, and time. Mental status is at baseline.      Gait: Gait normal.   Psychiatric:         Mood and Affect: Mood normal.         Behavior: Behavior normal.         DIAGNOSTIC RESULTS     EKG: All EKG's are interpreted by the Emergency Department Physician who either signs or Co-signs this chart in the absence of a cardiologist.        RADIOLOGY:   Non-plain film images such as CT, Ultrasound and MRI are read by the radiologist. Plain radiographic images are visualized and preliminarily

## 2024-09-07 NOTE — ED PROVIDER NOTES
[facility] Emergency Department    [address]  [place] [address]  Phone: [phone]  Fax: [phone]  Emergency Department  Faculty Attestation    I performed a history and physical examination of the patient and discussed management with the mid level provider. I reviewed the mid level provider's note and agree with the documented findings and plan of care. Any areas of disagreement are noted on the chart. I was personally present for the key portions of any procedures. I have documented in the chart those procedures where I was not present during the key portions. I have reviewed the emergency nurses triage note. I agree with the chief complaint, past medical history, past surgical history, allergies, medications, social and family history as documented unless otherwise noted below. Documentation of the HPI, Physical Exam and Medical Decision Making performed by medical students or scribes is based on my personal performance of the HPI, PE and MDM. For Physician Assistant/ Nurse Practitioner cases/documentation I have personally evaluated this patient and have completed at least one if not all key elements of the E/M (history, physical exam, and MDM). Additional findings are as noted.      Primary Care Physician:  No primary care provider on file.    CHIEF COMPLAINT       Chief Complaint   Patient presents with    Back Pain    Abdominal Pain     Pt is 3 weeks postpartum.  Having mid back pains that radiate around into left quadrants abdomen.  Treated  1 week ago for UTI.       RECENT VITALS:   Temp: 98.6 °F (37 °C),  Pulse: 97, Respirations: 18, BP: (!) 144/98    LABS:  Labs Reviewed   PREGNANCY, URINE   URINALYSIS   CBC WITH AUTO DIFFERENTIAL   COMPREHENSIVE METABOLIC PANEL   LIPASE         CT ABDOMEN PELVIS WO CONTRAST Additional Contrast? None (Final result)  Result time 09/07/24 19:26:08  Final result by Kendall Davis MD (09/07/24 19:26:08)                Impression:    No

## 2024-09-07 NOTE — DISCHARGE INSTRUCTIONS
Remember not to use ketorolac with ibuprofen as they are both NSAIDs.    Tylenol over-the-counter as directed to help with pain.    Continue with labetalol as previously prescribed.    Return to the ER: Fevers, continued worsening flank pain, abdominal pain, vomiting, abnormal vaginal discharge, return of urinary symptoms; or any other concerning symptoms.

## 2024-09-09 ENCOUNTER — CARE COORDINATION (OUTPATIENT)
Dept: OTHER | Facility: CLINIC | Age: 29
End: 2024-09-09

## 2024-09-09 LAB
MICROORGANISM SPEC CULT: NO GROWTH
SERVICE CMNT-IMP: NORMAL
SPECIMEN DESCRIPTION: NORMAL

## 2024-09-10 ENCOUNTER — CARE COORDINATION (OUTPATIENT)
Dept: OTHER | Facility: CLINIC | Age: 29
End: 2024-09-10

## 2024-09-30 NOTE — TELEPHONE ENCOUNTER
Dr. Dickens, patient interested in free prenatal vitamins and iron per response from Maternity Risk Survey.    Order for BS Baby Box pending for your convenience.    Thank you,  Daxa Diggs, PharmD  Ambulatory Care Clinical Pharmacist- [facility] Clinical Pharmacy  Department, toll free: [phone]  [facility]      =======================================================================    [facility] CLINICAL PHARMACY REVIEW - Be Well With Baby    BRANDON Mcbride is a 28 y.o. female currently identified as interested in receiving a [facility] \"Baby Box\" containing a 1 year supply of prenatal vitamins from [facility].    Contents of Baby Box:  Welcome Letter  6 month supply of Nature's Blend Prenatal Multivitamin with Minerals (Take 1 softgel once daily) with 1 refill    Thank you  Daxa Diggs, PharmVIK  Ambulatory Care Clinical Pharmacist- [facility] Clinical Pharmacy  Department, toll free: [phone]  [facility]

## 2024-10-01 ENCOUNTER — POSTPARTUM VISIT (OUTPATIENT)
Dept: OBGYN CLINIC | Age: 29
End: 2024-10-01

## 2024-10-01 VITALS
WEIGHT: 180 LBS | SYSTOLIC BLOOD PRESSURE: 128 MMHG | DIASTOLIC BLOOD PRESSURE: 80 MMHG | BODY MASS INDEX: 28.93 KG/M2 | HEIGHT: 66 IN

## 2024-10-01 DIAGNOSIS — Z86.32 HISTORY OF GESTATIONAL DIABETES: ICD-10-CM

## 2024-10-01 PROCEDURE — 0503F POSTPARTUM CARE VISIT: CPT | Performed by: NURSE PRACTITIONER

## 2024-10-01 RX ORDER — NORGESTIMATE AND ETHINYL ESTRADIOL 7DAYSX3 28
1 KIT ORAL DAILY
Qty: 28 TABLET | Refills: 3 | Status: SHIPPED | OUTPATIENT
Start: 2024-10-01

## 2024-10-01 ASSESSMENT — ENCOUNTER SYMPTOMS
CONSTIPATION: 0
NAUSEA: 0
VOMITING: 0
COUGH: 0
DIARRHEA: 0
SHORTNESS OF BREATH: 0

## 2024-10-01 ASSESSMENT — PATIENT HEALTH QUESTIONNAIRE - PHQ9
SUM OF ALL RESPONSES TO PHQ QUESTIONS 1-9: 0
SUM OF ALL RESPONSES TO PHQ9 QUESTIONS 1 & 2: 0
1. LITTLE INTEREST OR PLEASURE IN DOING THINGS: NOT AT ALL
SUM OF ALL RESPONSES TO PHQ QUESTIONS 1-9: 0
2. FEELING DOWN, DEPRESSED OR HOPELESS: NOT AT ALL

## 2024-10-01 ASSESSMENT — ANXIETY QUESTIONNAIRES
4. TROUBLE RELAXING: NOT AT ALL
7. FEELING AFRAID AS IF SOMETHING AWFUL MIGHT HAPPEN: NOT AT ALL
5. BEING SO RESTLESS THAT IT IS HARD TO SIT STILL: NOT AT ALL
1. FEELING NERVOUS, ANXIOUS, OR ON EDGE: NOT AT ALL
2. NOT BEING ABLE TO STOP OR CONTROL WORRYING: NOT AT ALL
3. WORRYING TOO MUCH ABOUT DIFFERENT THINGS: NOT AT ALL
6. BECOMING EASILY ANNOYED OR IRRITABLE: NOT AT ALL
IF YOU CHECKED OFF ANY PROBLEMS ON THIS QUESTIONNAIRE, HOW DIFFICULT HAVE THESE PROBLEMS MADE IT FOR YOU TO DO YOUR WORK, TAKE CARE OF THINGS AT HOME, OR GET ALONG WITH OTHER PEOPLE: NOT DIFFICULT AT ALL
GAD7 TOTAL SCORE: 0

## 2024-10-01 NOTE — PATIENT INSTRUCTIONS
Depression After Childbirth: Care Instructions  Your Care Instructions    Many women get the \"baby blues\" during the first few days after childbirth. You may lose sleep, feel irritable, and cry easily. You may feel happy one minute and sad the next. Hormone changes are one cause of these emotional changes. Also, the demands of a new baby, along with visits from relatives or other family needs, add to a mother's stress. The \"baby blues\" often peak around the fourth day. Then they ease up in less than 2 weeks.  If your moodiness or anxiety lasts for more than 2 weeks, or if you feel like life is not worth living, you may have postpartum depression. This is different for each mother. Some mothers with serious depression may worry intensely about their infant's well-being. Others may feel distant from their child. Some mothers might even feel that they might harm their baby. A mother may have signs of paranoia, wondering if someone is watching her.  Depression is not a sign of weakness. It is a medical condition that requires treatment. Medicine and counseling often work well to reduce depression. Talk to your doctor about taking antidepressant medicine while breastfeeding.  Follow-up care is a key part of your treatment and safety. Be sure to make and go to all appointments, and call your doctor if you are having problems. It's also a good idea to know your test results and keep a list of the medicines you take.  How do you know if you are depressed?  With all the changes in your life, you may not know if you are depressed. Pregnancy sometimes causes changes in how you feel that are similar to the symptoms of depression.  Symptoms of depression include:  Feeling sad or hopeless and losing interest in daily activities. These are the most common symptoms of depression.  Sleeping too much or not enough.  Feeling tired. You may feel as if you have no energy.  Eating too much or too little.  Writing or talking about death,

## 2024-10-02 ENCOUNTER — CARE COORDINATION (OUTPATIENT)
Dept: OTHER | Facility: CLINIC | Age: 29
End: 2024-10-02

## 2024-11-08 ENCOUNTER — OFFICE VISIT (OUTPATIENT)
Dept: PRIMARY CARE CLINIC | Age: 29
End: 2024-11-08
Payer: COMMERCIAL

## 2024-11-08 VITALS
HEART RATE: 103 BPM | HEIGHT: 66 IN | WEIGHT: 179.4 LBS | OXYGEN SATURATION: 99 % | RESPIRATION RATE: 15 BRPM | SYSTOLIC BLOOD PRESSURE: 132 MMHG | BODY MASS INDEX: 28.83 KG/M2 | DIASTOLIC BLOOD PRESSURE: 86 MMHG

## 2024-11-08 DIAGNOSIS — Z13.6 SCREENING FOR CARDIOVASCULAR CONDITION: ICD-10-CM

## 2024-11-08 DIAGNOSIS — F41.9 ANXIETY: Primary | ICD-10-CM

## 2024-11-08 DIAGNOSIS — Z87.59 HISTORY OF GESTATIONAL HYPERTENSION: ICD-10-CM

## 2024-11-08 PROCEDURE — 99204 OFFICE O/P NEW MOD 45 MIN: CPT | Performed by: NURSE PRACTITIONER

## 2024-11-08 RX ORDER — BUPROPION HYDROCHLORIDE 150 MG/1
150 TABLET ORAL EVERY MORNING
Qty: 90 TABLET | Refills: 1 | Status: SHIPPED | OUTPATIENT
Start: 2024-11-08

## 2024-11-08 ASSESSMENT — ENCOUNTER SYMPTOMS
CHEST TIGHTNESS: 0
SORE THROAT: 0
DIARRHEA: 0
VOMITING: 0
ABDOMINAL PAIN: 0
RHINORRHEA: 0
SHORTNESS OF BREATH: 0
COLOR CHANGE: 0
NAUSEA: 0

## 2024-11-08 ASSESSMENT — PATIENT HEALTH QUESTIONNAIRE - PHQ9
SUM OF ALL RESPONSES TO PHQ QUESTIONS 1-9: 0
SUM OF ALL RESPONSES TO PHQ9 QUESTIONS 1 & 2: 0
2. FEELING DOWN, DEPRESSED OR HOPELESS: NOT AT ALL
SUM OF ALL RESPONSES TO PHQ QUESTIONS 1-9: 0
1. LITTLE INTEREST OR PLEASURE IN DOING THINGS: NOT AT ALL
SUM OF ALL RESPONSES TO PHQ QUESTIONS 1-9: 0
SUM OF ALL RESPONSES TO PHQ QUESTIONS 1-9: 0

## 2024-11-08 NOTE — PROGRESS NOTES
[facility]  [facility] PRIMARY CARE  [address] [place] [address]  [address]  [facility] [address]  Dept: [phone]  Dept Fax: [phone]    Renate Mcbride is a 29 y.o. female who presentstoday for her medical conditions/complaints as noted below.  Renate Mcbride is c/o of  Chief Complaint   Patient presents with    New Patient     Establish Care, Concern for anxiety         HPI:     Presents to establish care   No significant PMH. Has not followed with PCP in years   Hx of gestational diabetes and elevated BP, both normal since delivery 3 months ago. Has 2 children, is  and gainfully employed at  [employer] at [employer]   C/o worsening anxiety symptoms, has been mild in past and feels it is time to address. Feels she may have struggled for years, did not interfere with ADLs  Denies depressive symptoms   Feels she has ruminating thoughts and cannot \"turn off\" and get back on track once she is worrying about something  Has not tried meds in past but is interested in initiating today            No results found for: \"LABA1C\"          ( goal A1C is < 7)   No components found for: \"LABMICR\"  No components found for: \"LDLCHOLESTEROL\", \"LDLCALC\"    (goal LDL is <100)   AST (U/L)   Date Value   09/07/2024 27     ALT (U/L)   Date Value   09/07/2024 40 (H)     BUN (mg/dL)   Date Value   09/07/2024 18     BP Readings from Last 3 Encounters:   11/08/24 132/86   10/01/24 128/80   09/07/24 (!) 130/91          ([id number])    Past Medical History:   Diagnosis Date    Stomach ulcer 2016      Past Surgical History:   Procedure Laterality Date    DILATION AND CURETTAGE OF UTERUS N/A 6/8/2023    DILATATION AND CURETTAGE SUCTION performed by Ty Argueta DO at [facility] OR    ENDOSCOPY, COLON, DIAGNOSTIC      FRACTURE SURGERY Right     Elbow    TONSILLECTOMY         History reviewed. No pertinent family history.    Social History     Tobacco Use    Smoking status: Never    Smokeless tobacco: Never   Substance Use

## 2024-11-13 ENCOUNTER — HOSPITAL ENCOUNTER (OUTPATIENT)
Age: 29
Setting detail: SPECIMEN
Discharge: HOME | End: 2024-11-13

## 2024-11-13 DIAGNOSIS — Z86.32 HISTORY OF GESTATIONAL DIABETES: ICD-10-CM

## 2024-11-13 DIAGNOSIS — Z13.6 SCREENING FOR CARDIOVASCULAR CONDITION: ICD-10-CM

## 2024-11-13 DIAGNOSIS — Z87.59 HISTORY OF GESTATIONAL HYPERTENSION: ICD-10-CM

## 2024-11-13 LAB
ALBUMIN SERPL-MCNC: 4.3 G/DL (ref 3.5–5.2)
ALBUMIN/GLOB SERPL: 1.5 {RATIO} (ref 1–2.5)
ALP SERPL-CCNC: 58 U/L (ref 35–104)
ALT SERPL-CCNC: 78 U/L (ref 10–35)
ANION GAP SERPL CALCULATED.3IONS-SCNC: 13 MMOL/L (ref 9–16)
AST SERPL-CCNC: 42 U/L (ref 10–35)
BILIRUB SERPL-MCNC: 0.9 MG/DL (ref 0–1.2)
BUN SERPL-MCNC: 14 MG/DL (ref 6–20)
CALCIUM SERPL-MCNC: 9.2 MG/DL (ref 8.6–10.4)
CHLORIDE SERPL-SCNC: 101 MMOL/L (ref 98–107)
CHOLEST SERPL-MCNC: 199 MG/DL (ref 0–199)
CHOLESTEROL/HDL RATIO: 3
CO2 SERPL-SCNC: 23 MMOL/L (ref 20–31)
CREAT SERPL-MCNC: 0.7 MG/DL (ref 0.6–0.9)
EST. AVERAGE GLUCOSE BLD GHB EST-MCNC: 88 MG/DL
GFR, ESTIMATED: >90 ML/MIN/1.73M2
GLUCOSE P FAST SERPL-MCNC: 88 MG/DL (ref 74–99)
GLUCOSE SERPL-MCNC: 92 MG/DL (ref 74–99)
HBA1C MFR BLD: 4.7 % (ref 4–6)
HDLC SERPL-MCNC: 67 MG/DL
LDLC SERPL CALC-MCNC: 108 MG/DL (ref 0–100)
POTASSIUM SERPL-SCNC: 4.6 MMOL/L (ref 3.7–5.3)
PROT SERPL-MCNC: 7.1 G/DL (ref 6.6–8.7)
SODIUM SERPL-SCNC: 137 MMOL/L (ref 136–145)
TRIGL SERPL-MCNC: 121 MG/DL (ref 0–149)
VLDLC SERPL CALC-MCNC: 24 MG/DL (ref 1–30)

## 2024-11-14 ENCOUNTER — HOSPITAL ENCOUNTER (OUTPATIENT)
Age: 29
Setting detail: SPECIMEN
Discharge: HOME | End: 2024-11-14

## 2024-11-14 ENCOUNTER — OFFICE VISIT (OUTPATIENT)
Dept: OBGYN CLINIC | Age: 29
End: 2024-11-14
Payer: COMMERCIAL

## 2024-11-14 VITALS
HEIGHT: 66 IN | DIASTOLIC BLOOD PRESSURE: 70 MMHG | WEIGHT: 178.5 LBS | BODY MASS INDEX: 28.69 KG/M2 | SYSTOLIC BLOOD PRESSURE: 116 MMHG

## 2024-11-14 DIAGNOSIS — Z12.4 CERVICAL CANCER SCREENING: ICD-10-CM

## 2024-11-14 DIAGNOSIS — Z01.419 WELL WOMAN EXAM WITH ROUTINE GYNECOLOGICAL EXAM: Primary | ICD-10-CM

## 2024-11-14 DIAGNOSIS — N94.6 DYSMENORRHEA: ICD-10-CM

## 2024-11-14 DIAGNOSIS — Z30.09 ENCOUNTER FOR COUNSELING REGARDING CONTRACEPTION: ICD-10-CM

## 2024-11-14 PROBLEM — R74.01 ELEVATED AST (SGOT): Status: RESOLVED | Noted: 2024-08-12 | Resolved: 2024-11-14

## 2024-11-14 PROBLEM — O14.13 SEVERE PRE-ECLAMPSIA IN THIRD TRIMESTER: Status: RESOLVED | Noted: 2024-08-19 | Resolved: 2024-11-14

## 2024-11-14 PROCEDURE — 99395 PREV VISIT EST AGE 18-39: CPT | Performed by: STUDENT IN AN ORGANIZED HEALTH CARE EDUCATION/TRAINING PROGRAM

## 2024-11-14 RX ORDER — NORGESTIMATE AND ETHINYL ESTRADIOL 7DAYSX3 28
1 KIT ORAL DAILY
Qty: 28 TABLET | Refills: 11 | Status: SHIPPED | OUTPATIENT
Start: 2024-11-14

## 2024-11-14 SDOH — ECONOMIC STABILITY: FOOD INSECURITY: WITHIN THE PAST 12 MONTHS, YOU WORRIED THAT YOUR FOOD WOULD RUN OUT BEFORE YOU GOT MONEY TO BUY MORE.: NEVER TRUE

## 2024-11-14 SDOH — ECONOMIC STABILITY: FOOD INSECURITY: WITHIN THE PAST 12 MONTHS, THE FOOD YOU BOUGHT JUST DIDN'T LAST AND YOU DIDN'T HAVE MONEY TO GET MORE.: NEVER TRUE

## 2024-11-14 SDOH — ECONOMIC STABILITY: INCOME INSECURITY: HOW HARD IS IT FOR YOU TO PAY FOR THE VERY BASICS LIKE FOOD, HOUSING, MEDICAL CARE, AND HEATING?: VERY HARD

## 2024-11-14 ASSESSMENT — PATIENT HEALTH QUESTIONNAIRE - PHQ9
SUM OF ALL RESPONSES TO PHQ QUESTIONS 1-9: 0
2. FEELING DOWN, DEPRESSED OR HOPELESS: NOT AT ALL
SUM OF ALL RESPONSES TO PHQ QUESTIONS 1-9: 0
1. LITTLE INTEREST OR PLEASURE IN DOING THINGS: NOT AT ALL
SUM OF ALL RESPONSES TO PHQ9 QUESTIONS 1 & 2: 0

## 2024-11-14 NOTE — PROGRESS NOTES
[facility] Annual Visit    Renate Mcrbide  2024                       Primary Care Physician: Mya Trejo, APRN - CNP    CC:   Chief Complaint   Patient presents with    Annual Exam         HPI: Renate Mcbride is a 29 y.o. female     The patient was seen and examined. Her Patient's last menstrual period was 10/21/2024..   She is here for an annual visit.     Menarche: 11  Menopause: N/A   Periods are regular on COCP  Heavy bleeding: No  Dysmenorrhea: No  Intermenstrual bleeding: No    Bowel habits are regular.   Denies any bloating.    Denies dysuria.   denies urinary leaking.    denies vaginal discharge.    is sexually active: male partner   uses oral contraceptives (estrogen/progesterone) for contraception and is not desiring pregnancy.    Gynecologic History:  -STD History: no past history  -Pap History: Pap 2020 NILM  -History high grade pap? no  -Colposcopy History: denies  -Permanent Sterilization: no  -Hormone Replacement Exposure: no      Health Maintenance:  [facility]: discussed  Date of Last Mammogram: N/A  Date of Last Colonoscopy: N/A  Date of Last Bone Density: N/A    Review of Symptoms:   -Constitutional: (-) fever, (-) chills  -HEENT: (-) visual disturbances, (-)headaches  -Respiratory: (-) dyspnea, (-) cough  -Cardiovascular: (-) chest pain, (-) palpitations  -Gastrointestinal: (-) abdominal pain, (-) N/V, (-) diarrhea, (-) constipation  -Genitourinary: (-) vaginal discharge  -Hematologic: (-) bruising  -Immunologic/Lymphatic: (-) recent illness, (-) recent sick contact      OBSTETRICAL HISTORY:  OB History    Para Term  AB Living   4 2 1 1 2 2   SAB IAB Ectopic Molar Multiple Live Births   2 0 0 0 0 2      # Outcome Date GA Lbr Archie/2nd Weight Sex Type Anes PTL Lv   4  24 36w0d 01:41 / 00:01 3.185 kg (7 lb 0.4 oz) M Vag-Spont EPI N EDENILSON      Name: Sudhir Mcbride      Apgar1: 8  Apgar5: 9   3 2023           2 2023

## 2024-11-16 LAB
HPV I/H RISK 4 DNA CVX QL NAA+PROBE: NOT DETECTED
HPV SAMPLE: NORMAL
HPV, INTERPRETATION: NORMAL
HPV16 DNA CVX QL NAA+PROBE: NOT DETECTED
HPV18 DNA CVX QL NAA+PROBE: NOT DETECTED
SPECIMEN DESCRIPTION: NORMAL

## 2024-12-02 LAB — CYTOLOGY REPORT: NORMAL

## 2025-01-17 DIAGNOSIS — N94.6 DYSMENORRHEA: Primary | ICD-10-CM

## 2025-01-17 RX ORDER — NORGESTIMATE AND ETHINYL ESTRADIOL 7DAYSX3 28
1 KIT ORAL DAILY
Qty: 28 TABLET | Refills: 0 | Status: SHIPPED | OUTPATIENT
Start: 2025-01-17

## 2025-01-17 NOTE — TELEPHONE ENCOUNTER
Medication Request/Refill Telephone Documentation:  Medication Requested: TRI SPRINTEC   Provider last filled by: ARISTEO  Last visit: 11/14/24  Last annual/pap smear: 11/14/24  NEXT APPT: N/A

## 2025-01-23 ENCOUNTER — OFFICE VISIT (OUTPATIENT)
Dept: PRIMARY CARE CLINIC | Age: 30
End: 2025-01-23
Payer: COMMERCIAL

## 2025-01-23 VITALS
SYSTOLIC BLOOD PRESSURE: 128 MMHG | HEART RATE: 106 BPM | WEIGHT: 181.2 LBS | OXYGEN SATURATION: 99 % | RESPIRATION RATE: 15 BRPM | BODY MASS INDEX: 29.25 KG/M2 | DIASTOLIC BLOOD PRESSURE: 86 MMHG

## 2025-01-23 DIAGNOSIS — L30.9 ECZEMA OF BOTH HANDS: ICD-10-CM

## 2025-01-23 DIAGNOSIS — F41.9 ANXIETY: Primary | ICD-10-CM

## 2025-01-23 DIAGNOSIS — R00.0 TACHYCARDIA: ICD-10-CM

## 2025-01-23 DIAGNOSIS — Z13.29 SCREENING FOR THYROID DISORDER: ICD-10-CM

## 2025-01-23 PROCEDURE — 99214 OFFICE O/P EST MOD 30 MIN: CPT | Performed by: NURSE PRACTITIONER

## 2025-01-23 RX ORDER — BUPROPION HYDROCHLORIDE 150 MG/1
150 TABLET ORAL EVERY MORNING
Qty: 90 TABLET | Refills: 1 | Status: SHIPPED | OUTPATIENT
Start: 2025-01-23

## 2025-01-23 RX ORDER — TRIAMCINOLONE ACETONIDE 1 MG/G
OINTMENT TOPICAL 2 TIMES DAILY
Qty: 15 G | Refills: 1 | Status: SHIPPED | OUTPATIENT
Start: 2025-01-23 | End: 2025-01-30

## 2025-01-23 RX ORDER — PREDNISONE 20 MG/1
20 TABLET ORAL DAILY
Qty: 5 TABLET | Refills: 0 | Status: SHIPPED | OUTPATIENT
Start: 2025-01-23 | End: 2025-01-28

## 2025-01-23 RX ORDER — METOPROLOL SUCCINATE 25 MG/1
12.5 TABLET, EXTENDED RELEASE ORAL DAILY
Qty: 135 TABLET | Refills: 1 | Status: SHIPPED | OUTPATIENT
Start: 2025-01-23

## 2025-01-23 ASSESSMENT — ENCOUNTER SYMPTOMS
VOMITING: 0
ABDOMINAL PAIN: 0
DIARRHEA: 0
CHEST TIGHTNESS: 0
NAUSEA: 0
SHORTNESS OF BREATH: 0
RHINORRHEA: 0
SORE THROAT: 0
COLOR CHANGE: 0

## 2025-01-23 ASSESSMENT — PATIENT HEALTH QUESTIONNAIRE - PHQ9
1. LITTLE INTEREST OR PLEASURE IN DOING THINGS: NOT AT ALL
SUM OF ALL RESPONSES TO PHQ9 QUESTIONS 1 & 2: 0
SUM OF ALL RESPONSES TO PHQ QUESTIONS 1-9: 0
SUM OF ALL RESPONSES TO PHQ QUESTIONS 1-9: 0
2. FEELING DOWN, DEPRESSED OR HOPELESS: NOT AT ALL
SUM OF ALL RESPONSES TO PHQ QUESTIONS 1-9: 0
SUM OF ALL RESPONSES TO PHQ QUESTIONS 1-9: 0

## 2025-01-23 NOTE — PROGRESS NOTES
[facility]  [facility] PRIMARY CARE  [address] [place] [address] DR  [address]  [facility] [address]  Dept: [phone]  Dept Fax: [phone]    Renate Mcbride is a 29 y.o. female who presentstoday for her medical conditions/complaints as noted below.  Renate Mcbride is c/o of  Chief Complaint   Patient presents with    Medication Check     Wellbutrin    Skin Problem     Concern for possible eczema flare up on her hands         HPI:     History of Present Illness  The patient is a 29-year-old female who presents for recheck of anxiety after initiating wellbutrin.     She reports that her anxiety has improved since starting Wellbutrin therapy, with a reduction in the severity of her anxious thoughts. She has been making a conscious effort to stay hydrated, although she acknowledges that her water intake could be improved.    She has been experiencing sleep disturbances, which she initially attributed to the medication. However, she now believes these disturbances may be due to her 5-month-old child's reflux condition. She plans to continue the current regimen to ascertain its impact on her sleep. She typically administers the medication at 6 AM. Prior to starting Wellbutrin, she would often wake up around 2 or 3 AM, but she is uncertain if this was due to the medication or her lifestyle. She has attempted to reduce her caffeine intake, particularly in the afternoons, to improve her sleep quality, but this has not resulted in significant changes.    She has a history of elevated heart rate for many years. She was on labetalol throughout her pregnancy for blood pressure management, which also helped control her heart rate. She does not experience palpitations or any abnormal heart sensations.     C/o worsening eczema on hands and in elbows and behind knees during winter months. Topical steroid creams have been ineffective as symptoms are worse than usual.         Hemoglobin A1C (%)   Date Value   11/13/2024

## 2025-01-24 ENCOUNTER — HOSPITAL ENCOUNTER (OUTPATIENT)
Age: 30
Setting detail: SPECIMEN
Discharge: HOME | End: 2025-01-24

## 2025-01-24 DIAGNOSIS — R00.0 TACHYCARDIA: ICD-10-CM

## 2025-01-24 DIAGNOSIS — Z13.29 SCREENING FOR THYROID DISORDER: ICD-10-CM

## 2025-01-24 LAB — TSH SERPL DL<=0.05 MIU/L-ACNC: 0.8 UIU/ML (ref 0.27–4.2)

## 2025-02-05 DIAGNOSIS — F41.9 ANXIETY: ICD-10-CM

## 2025-02-05 RX ORDER — BUPROPION HYDROCHLORIDE 150 MG/1
150 TABLET ORAL EVERY MORNING
Qty: 90 TABLET | Refills: 1 | Status: SHIPPED | OUTPATIENT
Start: 2025-02-05

## 2025-02-11 DIAGNOSIS — N94.6 DYSMENORRHEA: ICD-10-CM

## 2025-02-11 RX ORDER — NORGESTIMATE AND ETHINYL ESTRADIOL 7DAYSX3 28
1 KIT ORAL DAILY
Qty: 28 TABLET | Refills: 0 | Status: SHIPPED | OUTPATIENT
Start: 2025-02-11

## 2025-03-08 DIAGNOSIS — N94.6 DYSMENORRHEA: ICD-10-CM

## 2025-03-10 RX ORDER — NORGESTIMATE AND ETHINYL ESTRADIOL 7DAYSX3 28
1 KIT ORAL DAILY
Qty: 28 TABLET | Refills: 6 | Status: SHIPPED | OUTPATIENT
Start: 2025-03-10

## 2025-05-23 ENCOUNTER — OFFICE VISIT (OUTPATIENT)
Dept: PRIMARY CARE CLINIC | Age: 30
End: 2025-05-23
Payer: COMMERCIAL

## 2025-05-23 VITALS
SYSTOLIC BLOOD PRESSURE: 122 MMHG | RESPIRATION RATE: 15 BRPM | OXYGEN SATURATION: 99 % | WEIGHT: 180.4 LBS | HEART RATE: 100 BPM | BODY MASS INDEX: 29.12 KG/M2 | DIASTOLIC BLOOD PRESSURE: 78 MMHG

## 2025-05-23 DIAGNOSIS — R00.0 TACHYCARDIA: Primary | ICD-10-CM

## 2025-05-23 DIAGNOSIS — F41.9 ANXIETY: ICD-10-CM

## 2025-05-23 PROCEDURE — 99214 OFFICE O/P EST MOD 30 MIN: CPT | Performed by: NURSE PRACTITIONER

## 2025-05-23 SDOH — ECONOMIC STABILITY: FOOD INSECURITY: WITHIN THE PAST 12 MONTHS, YOU WORRIED THAT YOUR FOOD WOULD RUN OUT BEFORE YOU GOT MONEY TO BUY MORE.: NEVER TRUE

## 2025-05-23 SDOH — ECONOMIC STABILITY: FOOD INSECURITY: WITHIN THE PAST 12 MONTHS, THE FOOD YOU BOUGHT JUST DIDN'T LAST AND YOU DIDN'T HAVE MONEY TO GET MORE.: NEVER TRUE

## 2025-05-23 ASSESSMENT — ENCOUNTER SYMPTOMS
RHINORRHEA: 0
COLOR CHANGE: 0
CHEST TIGHTNESS: 0
VOMITING: 0
SHORTNESS OF BREATH: 0
DIARRHEA: 0
SORE THROAT: 0
ABDOMINAL PAIN: 0
NAUSEA: 0

## 2025-05-23 ASSESSMENT — PATIENT HEALTH QUESTIONNAIRE - PHQ9
SUM OF ALL RESPONSES TO PHQ QUESTIONS 1-9: 0
2. FEELING DOWN, DEPRESSED OR HOPELESS: NOT AT ALL
1. LITTLE INTEREST OR PLEASURE IN DOING THINGS: NOT AT ALL

## 2025-05-23 NOTE — PROGRESS NOTES
[facility]  [facility] PRIMARY CARE  [address] [place] [address]  [address]  [facility] [address]  Dept: [phone]  Dept Fax: [phone]    Renate Mcbride is a 29 y.o. female who presentstoday for her medical conditions/complaints as noted below.  Renate Mcbride is c/o of  Chief Complaint   Patient presents with    Anxiety     Wellbutrin - doing well on medication.          HPI:     History of Present Illness  The patient presents for a recheck of tachycardia.    She reports a significant improvement in her condition with addition of metoprolol. She notes that her heart rate, which was previously elevated during clinic visits, has decreased by approximately 20 beats per minute at home. She is not experiencing any episodes of dizziness, lightheadedness, or hypotension.     She expresses concern about the potential need for further investigation into the cause of her tachycardia, given her age and active lifestyle, which includes regular exercise 4 to 5 days per week. She also mentions a family history of mitral valve prolapse in her mother.     Anxiety remains well controlled with wellbutrin and she notes less angst with reduction of HR. Would like to continue medications as is.     FAMILY HISTORY  Her mother has a mitral valve prolapse.      Hemoglobin A1C (%)   Date Value   11/13/2024 4.7             ( goal A1C is < 7)   No components found for: \"LABMICR\"  No components found for: \"LDLCHOLESTEROL\", \"LDLCALC\"    (goal LDL is <100)   AST (U/L)   Date Value   11/13/2024 42 (H)     ALT (U/L)   Date Value   11/13/2024 78 (H)     BUN (mg/dL)   Date Value   11/13/2024 14     BP Readings from Last 3 Encounters:   05/23/25 122/78   01/23/25 128/86   11/14/24 116/70          ([id number])    Past Medical History:   Diagnosis Date    Stomach ulcer 2016      Past Surgical History:   Procedure Laterality Date    DILATION AND CURETTAGE OF UTERUS N/A 6/8/2023    DILATATION AND CURETTAGE SUCTION performed by Ty BAILEY

## 2025-06-18 LAB
CHOLEST SERPL-MCNC: 176 MG/DL (ref 0–199)
CHOLESTEROL/HDL RATIO: 3
GLUCOSE SERPL-MCNC: 98 MG/DL (ref 74–99)
HDLC SERPL-MCNC: 59 MG/DL
LDLC SERPL CALC-MCNC: 93 MG/DL (ref 0–100)
PATIENT FASTING?: YES
TRIGL SERPL-MCNC: 118 MG/DL
VLDLC SERPL CALC-MCNC: 24 MG/DL (ref 1–30)

## 2025-08-07 DIAGNOSIS — F41.9 ANXIETY: ICD-10-CM

## 2025-08-07 RX ORDER — BUPROPION HYDROCHLORIDE 150 MG/1
150 TABLET ORAL EVERY MORNING
Qty: 90 TABLET | Refills: 1 | Status: SHIPPED | OUTPATIENT
Start: 2025-08-07

## (undated) DEVICE — ST CHARLES PERI-GYN PACK: Brand: MEDLINE INDUSTRIES, INC.

## (undated) DEVICE — GLOVE ORANGE PI 7   MSG9070

## (undated) DEVICE — GOWN,AURORA,NONREINFORCED,LARGE: Brand: MEDLINE

## (undated) DEVICE — CURETTE VAC 10MM PLAS RIG STR

## (undated) DEVICE — MERCY HEALTH ST CHARLES: Brand: MEDLINE INDUSTRIES, INC.

## (undated) DEVICE — SET COLL TBNG L6FT DIA3/8IN W/ INTEGR SWVL HNDL SLIP RNG M

## (undated) DEVICE — SOLUTION IRRIG 1000ML STRL H2O USP PLAS POUR BTL

## (undated) DEVICE — SYSTEM COLL W/ TISS TRAP INCLUDE COLL CANSTR LID SET OF